# Patient Record
Sex: MALE | Race: WHITE | Employment: OTHER | URBAN - METROPOLITAN AREA
[De-identification: names, ages, dates, MRNs, and addresses within clinical notes are randomized per-mention and may not be internally consistent; named-entity substitution may affect disease eponyms.]

---

## 2021-03-31 ENCOUNTER — OFFICE VISIT (OUTPATIENT)
Dept: PRIMARY CARE CLINIC | Age: 61
End: 2021-03-31
Payer: COMMERCIAL

## 2021-03-31 VITALS
HEIGHT: 68 IN | WEIGHT: 130.6 LBS | BODY MASS INDEX: 19.79 KG/M2 | SYSTOLIC BLOOD PRESSURE: 122 MMHG | RESPIRATION RATE: 16 BRPM | TEMPERATURE: 96.9 F | DIASTOLIC BLOOD PRESSURE: 84 MMHG | OXYGEN SATURATION: 92 % | HEART RATE: 74 BPM

## 2021-03-31 DIAGNOSIS — Z53.20 COLONOSCOPY REFUSED: ICD-10-CM

## 2021-03-31 DIAGNOSIS — J45.909 ACUTE ASTHMATIC BRONCHITIS: Primary | ICD-10-CM

## 2021-03-31 DIAGNOSIS — F17.219 CIGARETTE NICOTINE DEPENDENCE WITH NICOTINE-INDUCED DISORDER: ICD-10-CM

## 2021-03-31 DIAGNOSIS — Z12.11 SCREENING FOR COLON CANCER: ICD-10-CM

## 2021-03-31 DIAGNOSIS — R06.02 SHORTNESS OF BREATH: ICD-10-CM

## 2021-03-31 DIAGNOSIS — R05.9 COUGH: ICD-10-CM

## 2021-03-31 DIAGNOSIS — R53.83 OTHER FATIGUE: ICD-10-CM

## 2021-03-31 PROCEDURE — 4004F PT TOBACCO SCREEN RCVD TLK: CPT | Performed by: INTERNAL MEDICINE

## 2021-03-31 PROCEDURE — 99204 OFFICE O/P NEW MOD 45 MIN: CPT | Performed by: INTERNAL MEDICINE

## 2021-03-31 PROCEDURE — G8427 DOCREV CUR MEDS BY ELIG CLIN: HCPCS | Performed by: INTERNAL MEDICINE

## 2021-03-31 PROCEDURE — 3017F COLORECTAL CA SCREEN DOC REV: CPT | Performed by: INTERNAL MEDICINE

## 2021-03-31 PROCEDURE — G8484 FLU IMMUNIZE NO ADMIN: HCPCS | Performed by: INTERNAL MEDICINE

## 2021-03-31 PROCEDURE — G8420 CALC BMI NORM PARAMETERS: HCPCS | Performed by: INTERNAL MEDICINE

## 2021-03-31 RX ORDER — AZITHROMYCIN 250 MG/1
250 TABLET, FILM COATED ORAL SEE ADMIN INSTRUCTIONS
Qty: 6 TABLET | Refills: 0 | Status: SHIPPED | OUTPATIENT
Start: 2021-03-31 | End: 2021-04-05

## 2021-03-31 RX ORDER — PREDNISONE 10 MG/1
TABLET ORAL
COMMUNITY
Start: 2021-03-29 | End: 2022-09-19

## 2021-03-31 RX ORDER — ALBUTEROL SULFATE 90 UG/1
AEROSOL, METERED RESPIRATORY (INHALATION)
COMMUNITY
Start: 2021-03-29

## 2021-03-31 SDOH — HEALTH STABILITY: MENTAL HEALTH: HOW OFTEN DO YOU HAVE A DRINK CONTAINING ALCOHOL?: NOT ASKED

## 2021-03-31 SDOH — ECONOMIC STABILITY: FOOD INSECURITY: WITHIN THE PAST 12 MONTHS, YOU WORRIED THAT YOUR FOOD WOULD RUN OUT BEFORE YOU GOT MONEY TO BUY MORE.: NOT ASKED

## 2021-03-31 SDOH — ECONOMIC STABILITY: TRANSPORTATION INSECURITY
IN THE PAST 12 MONTHS, HAS THE LACK OF TRANSPORTATION KEPT YOU FROM MEDICAL APPOINTMENTS OR FROM GETTING MEDICATIONS?: NOT ASKED

## 2021-03-31 SDOH — ECONOMIC STABILITY: FOOD INSECURITY: WITHIN THE PAST 12 MONTHS, THE FOOD YOU BOUGHT JUST DIDN'T LAST AND YOU DIDN'T HAVE MONEY TO GET MORE.: NOT ASKED

## 2021-03-31 ASSESSMENT — ENCOUNTER SYMPTOMS
RHINORRHEA: 0
WHEEZING: 1
SHORTNESS OF BREATH: 1
SINUS PRESSURE: 0
BLOOD IN STOOL: 0
TROUBLE SWALLOWING: 0
SORE THROAT: 1
SWOLLEN GLANDS: 0
EYE DISCHARGE: 0
SPUTUM PRODUCTION: 1
ABDOMINAL PAIN: 0
VOMITING: 0
NAUSEA: 0
CHEST TIGHTNESS: 0
EYE PAIN: 0
COUGH: 1
SINUS PAIN: 0

## 2021-03-31 ASSESSMENT — PATIENT HEALTH QUESTIONNAIRE - PHQ9
SUM OF ALL RESPONSES TO PHQ QUESTIONS 1-9: 0
1. LITTLE INTEREST OR PLEASURE IN DOING THINGS: 0
SUM OF ALL RESPONSES TO PHQ9 QUESTIONS 1 & 2: 0

## 2021-03-31 NOTE — PATIENT INSTRUCTIONS
COVID-19 test.    Blood work right now here and chest x-ray at [facility] [id number] [facility] x-ray department. COVID-19 precautions. Must quit smoking today or will need hospitalization. Explained -- chewable Vitamin C 500 mg 2 times a day and zinc 50 mg once a day and vitamin D3 2000 units every day --can help the body immune system to fight infection. Salt water gargles 3 times a day until throat symptoms completely gone and nothing to eat or drink for 1 hour after salt water gargles. Isolate yourself in the room with probably open window until all your symptoms are gone for 7 days. Ask any friends or family exposed to you get tested for COVID-19. Lot of fruits or vegetables or making a smoothie with yogurt. Can have ginger garlic turmeric etc. to boost their immune system. Ice packs for fever. Yogurt or probiotics. Avoid exertion. lot of fluids in daytime. Lean meats or eggs. Late night eating or drinking can cause cough. If coughing-lay on the belly rather than laying on the back. As laying on the belly puts you at higher risk of pneumonia. [place] or Robitussin DM or Mucinex DM for cough. For Allergies/sneezing- Loratadine 10 mg a day or fexofenadine 180 mg a day or  Cetirizine 10 mg a day can be sedating to some people    For Pain: Acetaminophen 500 mg- two tabs 3 x a day as needed. Avoid Motrin Advil Aleve or ibuprofen as in some patients it can affect the kidneys. Buy a pulse oximeter and check your pulse ox and make sure it is staying above 90%. If pulse oximeter shows pulse ox less than 90%-must go to ER. If short of breath or getting worse, then go to emergency room. Zithromax--yogurt a lot of fluids. Advair twice a day gargle and spit out with water after that. Strongly encouraged to quit using tobacco.   You can use off and on nicotine gum or lozenges to help quit smoking.     The [address] for Disease Control and Prevention states: Tobacco use harms every organ of the body which leads to disease and disability.  Tobacco use causes cancer, heart disease, stroke, and lung diseases (including emphysema, bronchitis, and chronic airway obstruction). We have many other ways to help you quit using tobacco.     We suggest this website:  www.smokefree. gov   You might also like this cell phone text message program.  Text message charges apply on your cell phone plan. Text the word QUIT to IQUIT to get started.  This program offers free telephone counseling. Dial 1-800-QUIT-Now    Discussed use, benefit, and side effects of prescribed medications. Barriers to compliance discussed. All patient questions answered. Pt voiced understanding. IF YOU NEED A PRESCRIPTION REFILL, THEN PLEASE GIVE US THREE WORKING DAYS TO REFILL A PRESCRIPTION. Office Hours to Answer Questions--Monday thru Thursday --9.00 AM to 4.00 PM    Please get all OVER DUE VACCINATIONS done at the pharmacy or health department as soon as possible.

## 2021-03-31 NOTE — PROGRESS NOTES
constantly. The problem has been gradually worsening. Associated symptoms include a sore throat, sputum production and wheezing. Pertinent negatives include no abdominal pain, chest pain, ear pain, fever, headaches, leg swelling, PND, rash, rhinorrhea, swollen glands or vomiting. The symptoms are aggravated by smoke. The patient has no known risk factors for DVT/PE. He has tried beta agonist inhalers for the symptoms. The treatment provided mild relief. Fatigue  This is a new problem. Episode onset: 4years. The problem occurs intermittently. The problem has been gradually worsening. Associated symptoms include coughing, fatigue and a sore throat. Pertinent negatives include no abdominal pain, chest pain, chills, congestion, fever, headaches, myalgias, nausea, numbness, rash, swollen glands, vomiting or weakness. Nothing aggravates the symptoms. He has tried nothing for the symptoms. The treatment provided no relief. URI   This is a new problem. The current episode started in the past 7 days (4days). The problem has been gradually improving. There has been no fever. Associated symptoms include coughing, a sore throat and wheezing. Pertinent negatives include no abdominal pain, chest pain, congestion, ear pain, headaches, nausea, rash, rhinorrhea, sinus pain, swollen glands or vomiting. He has tried inhaler use (Steroid tablets) for the symptoms. The treatment provided mild relief. Minimal by basilar atelectasis. Old granulomatous disease. No acute disease. 2018  Review of Systems   Constitutional: Positive for fatigue. Negative for appetite change, chills, fever and unexpected weight change. HENT: Positive for sore throat. Negative for congestion, ear discharge, ear pain, nosebleeds, rhinorrhea, sinus pressure, sinus pain and trouble swallowing. Eyes: Negative for pain and discharge. Respiratory: Positive for cough, sputum production, shortness of breath and wheezing. Negative for chest tightness. Cardiovascular: Negative for chest pain, palpitations, leg swelling and PND. Gastrointestinal: Negative for abdominal pain, blood in stool, nausea and vomiting. Endocrine: Negative for polydipsia and polyphagia. Genitourinary: Negative for difficulty urinating, enuresis, flank pain and hematuria. Musculoskeletal: Negative for myalgias. Skin: Negative for rash. Neurological: Negative for facial asymmetry, weakness, light-headedness, numbness and headaches. Psychiatric/Behavioral: Negative for confusion. Current Outpatient Medications on File Prior to Visit   Medication Sig Dispense Refill    albuterol sulfate  (90 Base) MCG/ACT inhaler       predniSONE (DELTASONE) 10 MG tablet        No current facility-administered medications on file prior to visit. Allergies   Allergen Reactions    Codeine Shortness Of Breath and Nausea And Vomiting     Past Medical History:   Diagnosis Date    Asthma      Past Surgical History:   Procedure Laterality Date    FINGER AMPUTATION  2016    right ring finger      Social History     Tobacco Use    Smoking status: Current Every Day Smoker     Years: 40.00     Types: Cigarettes    Smokeless tobacco: Never Used   Substance Use Topics    Alcohol use: Yes     Comment: 2-3 beer a day      Family History   Problem Relation Age of Onset    Esophageal Cancer Mother         Vitals:    03/31/21 1156   BP: 122/84   Site: Left Upper Arm   Position: Sitting   Cuff Size: Medium Adult   Pulse: 74   Resp: 16   Temp: 96.9 °F (36.1 °C)   SpO2: 92%   Weight: 130 lb 9.6 oz (59.2 kg)   Height: 5' 8\" (1.727 m)     Estimated body mass index is 19.86 kg/m² as calculated from the following:    Height as of this encounter: 5' 8\" (1.727 m). Weight as of this encounter: 130 lb 9.6 oz (59.2 kg). Physical Exam  Vitals signs and nursing note reviewed. Constitutional:       General: He is not in acute distress. HENT:      Head: Normocephalic and atraumatic. Right Ear: Tympanic membrane, ear canal and external ear normal.      Left Ear: Tympanic membrane, ear canal and external ear normal.   Eyes:      General: Lids are normal.      Conjunctiva/sclera: Conjunctivae normal.      Pupils: Pupils are equal, round, and reactive to light. Neck:      Musculoskeletal: Neck supple. Thyroid: No thyromegaly. Vascular: No JVD. Trachea: No tracheal deviation. Cardiovascular:      Rate and Rhythm: Normal rate and regular rhythm. Heart sounds: Normal heart sounds. No gallop. Pulmonary:      Effort: Pulmonary effort is normal. No respiratory distress. Breath sounds: Wheezing (  Bilateral tight wheezing) and rales present. Abdominal:      General: Bowel sounds are normal.      Palpations: Abdomen is soft. There is no mass. Tenderness: There is no abdominal tenderness. Musculoskeletal:         General: No tenderness. Comments: No leg edema or calf tenderness   Lymphadenopathy:      Cervical: No cervical adenopathy. Skin:     General: Skin is warm and dry. Findings: No rash. Neurological:      General: No focal deficit present. Mental Status: He is alert and oriented to person, place, and time. Cranial Nerves: No cranial nerve deficit. Sensory: No sensory deficit. Psychiatric:         Mood and Affect: Mood normal.         Behavior: Behavior normal.         Thought Content: Thought content normal.         Judgment: Judgment normal.         ASSESSMENT/PLAN:  1. Acute asthmatic bronchitis    - CBC Auto Differential; Future  - fluticasone-salmeterol (ADVAIR) 250-50 MCG/DOSE AEPB; Inhale 1 puff into the lungs every 12 hours  Dispense: 60 each; Refill: 0-gargle and spit out after that and eat food. - azithromycin (ZITHROMAX) 250 MG tablet; Take 1 tablet by mouth See Admin Instructions for 5 days 500mg on day 1 followed by 250mg on days 2 - 5  Dispense: 6 tablet; Refill: 0    2.  Shortness of breath    - Comprehensive Metabolic Panel; Future  - XR CHEST STANDARD (2 VW); Future  - Brain Natriuretic Peptide; Future  - COVID-19 Ambulatory; Future reviewed the information on COVID-19.    3. Other fatigue    - T4, Free; Future  - TSH with Reflex; Future    4. Cigarette nicotine dependence with nicotine-induced disorder  No cigarettes from today and till he gets better with the shortness of breath and maybe stay quit. 5. Cough    - CBC Auto Differential; Future  - Sedimentation Rate; Future  - COVID-19 Ambulatory; Future    6. Screening for colon cancer  Recommended to check for colon cancer risk. 7. Colonoscopy refused  Understanding the risk of cancer spread and death. Return in about 6 days (around 4/6/2021) for Labs and medication follow-up. Patient Instructions   COVID-19 test.    Blood work right now here and chest x-ray at [address], [facility] x-ray department. COVID-19 precautions. Must quit smoking today or will need hospitalization. Explained -- chewable Vitamin C 500 mg 2 times a day and zinc 50 mg once a day and vitamin D3 2000 units every day --can help the body immune system to fight infection. Salt water gargles 3 times a day until throat symptoms completely gone and nothing to eat or drink for 1 hour after salt water gargles. Isolate yourself in the room with probably open window until all your symptoms are gone for 7 days. Ask any friends or family exposed to you get tested for COVID-19. Lot of fruits or vegetables or making a smoothie with yogurt. Can have emmanuelle garlic turmeric etc. to boost their immune system. Ice packs for fever. Yogurt or probiotics. Avoid exertion. lot of fluids in daytime. Lean meats or eggs. Late night eating or drinking can cause cough. If coughing-lay on the belly rather than laying on the back. As laying on the belly puts you at higher risk of pneumonia. [place] or Robitussin DM or Mucinex DM for cough.     For Allergies/sneezing- Loratadine 10 mg a day or fexofenadine 180 mg a day or  Cetirizine 10 mg a day can be sedating to some people    For Pain: Acetaminophen 500 mg- two tabs 3 x a day as needed. Avoid Motrin Advil Aleve or ibuprofen as in some patients it can affect the kidneys. Buy a pulse oximeter and check your pulse ox and make sure it is staying above 90%. If pulse oximeter shows pulse ox less than 90%-must go to ER. If short of breath or getting worse, then go to emergency room. Zithromax--yogurt a lot of fluids. Advair twice a day gargle and spit out with water after that. Strongly encouraged to quit using tobacco.   You can use off and on nicotine gum or lozenges to help quit smoking. The [address] for Disease Control and Prevention states:    Tobacco use harms every organ of the body which leads to disease and disability.  Tobacco use causes cancer, heart disease, stroke, and lung diseases (including emphysema, bronchitis, and chronic airway obstruction). We have many other ways to help you quit using tobacco.     We suggest this website:  www.smokefree. gov   You might also like this cell phone text message program.  Text message charges apply on your cell phone plan. Text the word QUIT to PHILL to get started.  This program offers free telephone counseling. Dial 1800-QUIT-Now    Discussed use, benefit, and side effects of prescribed medications. Barriers to compliance discussed. All patient questions answered. Pt voiced understanding. IF YOU NEED A PRESCRIPTION REFILL, THEN PLEASE GIVE US THREE WORKING DAYS TO REFILL A PRESCRIPTION. Office Hours to Answer Questions--Monday thru Thursday --9.00 AM to 4.00 PM    Please get all OVER DUE VACCINATIONS done at the pharmacy or health department as soon as possible. Electronically signed by Joann Harrington MD on 3/31/2021 at 12:58 PM     This dictation was generated by voice recognition computer software.  Although all attempts are made to edit the dictation for accuracy, there may be errors in the transcription that are not intended.

## 2021-04-01 ENCOUNTER — TELEPHONE (OUTPATIENT)
Dept: PRIMARY CARE CLINIC | Age: 61
End: 2021-04-01

## 2021-04-01 NOTE — TELEPHONE ENCOUNTER
I tried to call the patient to set up a covid test but his VM has not been set up. I was unable to LVM.

## 2021-04-01 NOTE — TELEPHONE ENCOUNTER
I spoke with pt and asked if or when he was going to have his labs and xray done he stated he thought he would go next week before his appt. Advised pt to try and go today (perferably) or in the morning. Pt asked lab hrs for today and tomorrow wich I gave 7am-330pm. And advised to get xray at [facility].

## 2021-04-01 NOTE — TELEPHONE ENCOUNTER
----- Message from Denisse Devlin MD sent at 4/1/2021  8:36 AM EDT -----  Regarding: labs xray  Why did not he do blood work here? Why did not he go for the chest x-ray yesterday?   If his shortness of breath gets worse as he has not done the test he may have to go to emergency room get admitted to the hospital.

## 2021-04-30 PROBLEM — Z12.11 SCREENING FOR COLON CANCER: Status: RESOLVED | Noted: 2021-03-31 | Resolved: 2021-04-30

## 2021-04-30 PROBLEM — R05.9 COUGH: Status: RESOLVED | Noted: 2021-03-31 | Resolved: 2021-04-30

## 2022-08-29 ENCOUNTER — OFFICE VISIT (OUTPATIENT)
Dept: ENT CLINIC | Age: 62
End: 2022-08-29
Payer: COMMERCIAL

## 2022-08-29 VITALS — TEMPERATURE: 98 F | HEART RATE: 74 BPM | DIASTOLIC BLOOD PRESSURE: 90 MMHG | SYSTOLIC BLOOD PRESSURE: 128 MMHG

## 2022-08-29 DIAGNOSIS — J38.3 LESION OF TRUE VOCAL CORD: ICD-10-CM

## 2022-08-29 DIAGNOSIS — R49.0 DYSPHONIA: ICD-10-CM

## 2022-08-29 DIAGNOSIS — J31.0 CHRONIC RHINITIS: ICD-10-CM

## 2022-08-29 DIAGNOSIS — J38.3 VOCAL CORD MASS: Primary | ICD-10-CM

## 2022-08-29 DIAGNOSIS — J34.2 DEVIATED NASAL SEPTUM: ICD-10-CM

## 2022-08-29 PROCEDURE — G8421 BMI NOT CALCULATED: HCPCS | Performed by: STUDENT IN AN ORGANIZED HEALTH CARE EDUCATION/TRAINING PROGRAM

## 2022-08-29 PROCEDURE — 31231 NASAL ENDOSCOPY DX: CPT | Performed by: STUDENT IN AN ORGANIZED HEALTH CARE EDUCATION/TRAINING PROGRAM

## 2022-08-29 PROCEDURE — 3017F COLORECTAL CA SCREEN DOC REV: CPT | Performed by: STUDENT IN AN ORGANIZED HEALTH CARE EDUCATION/TRAINING PROGRAM

## 2022-08-29 PROCEDURE — 99204 OFFICE O/P NEW MOD 45 MIN: CPT | Performed by: STUDENT IN AN ORGANIZED HEALTH CARE EDUCATION/TRAINING PROGRAM

## 2022-08-29 PROCEDURE — 4004F PT TOBACCO SCREEN RCVD TLK: CPT | Performed by: STUDENT IN AN ORGANIZED HEALTH CARE EDUCATION/TRAINING PROGRAM

## 2022-08-29 PROCEDURE — G8427 DOCREV CUR MEDS BY ELIG CLIN: HCPCS | Performed by: STUDENT IN AN ORGANIZED HEALTH CARE EDUCATION/TRAINING PROGRAM

## 2022-08-29 RX ORDER — BUDESONIDE AND FORMOTEROL FUMARATE DIHYDRATE 160; 4.5 UG/1; UG/1
2 AEROSOL RESPIRATORY (INHALATION) 2 TIMES DAILY
COMMUNITY
Start: 2022-07-12

## 2022-08-29 NOTE — PROGRESS NOTES
Sarah [facility] ESTHER Levin (:  1960) is a 57 y.o. male, here for evaluation of the following chief complaint(s): Other (Throat issue for a few months hard to speak)      ASSESSMENT/PLAN:  1. Vocal cord mass  -     CT SOFT TISSUE NECK W CONTRAST; Future  -     CT CHEST W CONTRAST; Future  2. Dysphonia  3. Lesion of true vocal cord  4. Deviated nasal septum  5. Chronic rhinitis      This is a very pleasant 57 y.o. male here today for evaluation of the the above-noted complaints. 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 There is also some whitish changes to the right true vocal cord. I discussed with the patient that the neck step will be to get a videostrobe with her speech and language pathologist.  I am ordering a CT neck and chest to evaluate for underlying extension or spread of the lesion. We will see him back to go over his CT scans and discuss neck steps. We discussed that we will need to at least perform a biopsy to determine next steps. We will plan to observe his sinonasal symptoms/findings for now while we work-up his vocal cord lesion. I have asked patient to try to work on smoking cessation. Medical Decision Making: The following items were considered in medical decision making:  Independent review of images  Review / order clinical lab tests  Review / order radiology tests  Decision to obtain old records  Review and summation of [facility]    SUBJECTIVE/OBJECTIVE:  JOSH Cohn is here today for evaluation of voice and nasal issues. The patient states that he has had at least 4 months of hoarseness. It is gradually getting worse. Patient gets occasional loss of voice. He denies pain with speaking.   He denies dysphagia. Patient gets some nasal obstruction and clear nasal drainage. He gets crusting from both nostrils. He denies fevers chills or night sweats. He denies unintentional weight loss. He denies neck masses. He had a CT scan done in January 2022 which showed a lesion of the vocal cord at that time. The patient, he was not informed that his CT scan showed anything on his vocal cord. The patient smokes about a half a pack per day. He has been smoking for over 40 years. He does not drink. REVIEW OF SYSTEMS  The following systems were reviewed and revealed the following in addition to any already discussed in the HPI:    PHYSICAL EXAM    GENERAL: No acute distress, alert and oriented, hoarse and rough voice  EYES: EOMI, Anti-icteric  HENT:   Head: Normocephalic and atraumatic. Face:  Symmetric, facial nerve intact  Right Ear: Normal external ear, normal external auditory canal, intact tympanic membrane with normal mobility and aerated middle ear  Left Ear: Normal external ear, normal external auditory canal, intact tympanic membrane with normal mobility and aerated middle ear  Mouth/Oral Cavity:  normal lips, Uvula is midline, no mucosal lesions, no trismus, very poor dentition, normal salivary quality/flow  Oropharynx/Larynx:  normal oropharynx,    Nose:Normal external nasal appearance. Anterior rhinoscopy shows  a deviated septum preventing view posteriorly. Normal appearing turbinates.   Normal mucosa   NECK: Normal range of motion, no thyromegaly, trachea is midline, no lymphadenopathy, no neck masses, no crepitus  CHEST: Normal respiratory effort, no retractions, breathing comfortably  SKIN: No rashes, normal appearing skin, no evidence of skin lesions/tumors  Neuro:  cranial nerve II-XII intact; normal gait  Cardio:  no edema        PROCEDURE  Nasal Endoscopy (CPT code 70443)       Due to the patients chronic sinus disease and/or history of sinonasal neoplasm for surveillance a nasal endoscopy with or without debridement will be performed to complete a significant physical examination of the patient which cannot be performed by anterior rhinoscopy alone (failure of complete examination of the paranasal sinuses). Failure to provide this procedure may lead to late detection of significant chronic benign disease, acute exacerbation, resolution or failure of early diagnosis of recurrent cancer. The procedure report is present in the body of the chart. Verbal consent was received. After topical anesthesia and decongestion had been obtained using aerosolized 1% lidocaine and oxymetazoline, a 45 degree rigid endoscope was placed into both nares with the patient in a sitting position. The following was observed:    Septum: intact and deviated   Other:   -The inferior and middle turbinates were examined. The middle meatus, and sphenoethmoid recess was examined bilaterally.    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 The nasopharynx, oropharynx, hypopharynx, supraglottis and glottis were visualized. -Evidence of an exophytic lesion involving the entire left true cord. There was some whitish changes to the right true cord. True vocal cord motion was diminished on the left. -There were no complications. Tolerated well without complication. I attest that I was present for and did the entire procedure myself. This note was generated completely or in part utilizing Dragon dictation speech recognition software. Occasionally, words are mistranscribed and despite editing, the text may contain inaccuracies due to incorrect word recognition. If further clarification is needed please contact the office at [phone]. An electronic signature was used to authenticate this note.     --Leticia Taylor MD

## 2022-09-01 ENCOUNTER — PROCEDURE VISIT (OUTPATIENT)
Dept: SPEECH THERAPY | Age: 62
End: 2022-09-01
Payer: COMMERCIAL

## 2022-09-01 DIAGNOSIS — R49.0 DYSPHONIA: Primary | ICD-10-CM

## 2022-09-01 DIAGNOSIS — J38.3 LESION OF TRUE VOCAL CORD: ICD-10-CM

## 2022-09-01 PROCEDURE — 92507 TX SP LANG VOICE COMM INDIV: CPT | Performed by: SPEECH-LANGUAGE PATHOLOGIST

## 2022-09-01 PROCEDURE — 92520 LARYNGEAL FUNCTION STUDIES: CPT | Performed by: SPEECH-LANGUAGE PATHOLOGIST

## 2022-09-01 PROCEDURE — 92524 BEHAVRAL QUALIT ANALYS VOICE: CPT | Performed by: SPEECH-LANGUAGE PATHOLOGIST

## 2022-09-01 PROCEDURE — 31579 LARYNGOSCOPY TELESCOPIC: CPT | Performed by: SPEECH-LANGUAGE PATHOLOGIST

## 2022-09-01 NOTE — Clinical Note
May benefit from reflux meds to reduce irritation. Also interested in prescription for nebulizer given breathing complaints. Thank you!

## 2022-09-01 NOTE — PROGRESS NOTES
[facility]) ENT  Videostroboscopic Examination of the Larynx    BACKGROUND HISTORY:  Pt w/ hx of hoarseness that onset in Jan '22; completed CT per pulmonologist revealing suspected nodule on LTVF and referred to ENT however, pt did not follow-through w/ appt until this month. Reported dysphonia characterized by roughness and reduced volume; denied pain/discomfort w/ use. Dyspnea characterized by sensation of restricted breathing; hx of COPD and asthma. Would like to obtain nebulizer for home use. Denied dysphagia. Denied hx of acid reflux; not currently on any medications. Seen by ENT and referred for further documentation of concerning LTVF lesion. Surgical/Medical History: See the above. Hydration: <64 oz daily  Smoking History: 1 ppd for 40 years  Caffeine Intake: Coffee/pop    PER ENT NOTE, Dr. Nabila Monreal, 8/29/22:  I discussed with the patient that the neck step will be to get a videostrobe with her speech and language pathologist.  I am ordering a CT neck and chest to evaluate for underlying extension or spread of the lesion. Perceptual Quality: Pt presented with severe dysphonia characterized by reduced conversational volume, roughness, breathiness, and pressed phonation. Acoustic Analysis:  Maximum Sustained Phonation- 6 seconds  Avg Hz- 168  Max Hz- 216  Min Hz- 97    Flexible Stroboscopy Laryngoscopy  Procedure : Flexible Stroboscopy Laryngoscopy  Performed by: Pio Bashir, SLP  Anesthesia: NA (found anesthesia bothersome after initial scope)  Description:  The scope was passed along the floor of the left naris to the level of the larynx. There was no evidence of concerning masses or lesions of the base of tongue, vallecula, epiglottis, aryepiglottic folds, arytenoids, false vocal folds, true vocal folds, or pyriform sinuses. The scope was removed. The patient tolerated the procedure without difficulty. There were no complications.   Pertinent findings: See Assessment and Plan of Care ASSESSMENT AND PLAN OF CARE:   57 y.o. male w/ hx of dysphonia; onset Jan '22 w/ CT concerning for LTVF nodule. VLS revealed generalized irritation of pharynx/larynx, including erythema of nasopharynx and thick pharyngeal sputum w/ pooling of frothy secretions. LTVF w/ white, exophytic changes extending anterior/posterior and increased irregular changes on medial-posterior extending inferior. Unable to fully assess RTVF d/t obstruction of LTVF but posterior 1/3rd appears edematous w/o irregular tissue changes. Glottic closure irregular w/ absent mucosal wave of LTVF; unable to visualize RTVF during phonation. Supraglottic compression (LM>AP). Mild-moderate interarytenoid pachydermia and posterior edema, erythema and thick mucus were observed, indicative of laryngopharyngeal reflux. No paresis or paralysis was noted. EDUCATION AND THERAPY:  Reviewed VLS w/ pt and girlfriend Will Nicki); educated to presence of LTVF changes and direct impact on both dysphonia/dyspnea. Discussed restricted patency of glottis resulting in increased work of breathing; pt expressed understanding. Majority of session spent educating on POC, including ENT recommendation for biopsy to determine treatment. Pt expressed understanding. At this time, pt to f/u w/ ENT and will RTC for therapeutic intervention as warranted. PATIENT GOALS:  TBD    SLP recommended: Yes  Barriers to treatment: Unknown etiology    RECOMMENDATIONS:   Dr. Luciana Aquino was present and reviewed recorded evaluation to assist in diagnosis and provide assessment and plan for treatment. Follow good vocal hygiene behaviors and precautions, including increasing oral hydration. Follow dietary precautions and behavioral lifestyle changes regarding laryngopharyngeal reflux, including taking PPI as prescribed by physician. Pt is a good candidate for further medical evaluation/intervention at the discretion of the treating physician.    Pt to follow up with ENT/ [place].      CPT Code Units Billed Time Billed Today Date of POC Start Re-Certification Date Referring Provider   25064, 92135, 27994, 44277 4 Unit Time in: 3059  Time out: 1005  Total time: 35 min 9/1/2022 60 days Dr. Amanda Penny       Thank you,    Veverephraim Bey) [place], [place], Kenneth Ball; [id number]  Voice Specialized Speech-Language Pathologist

## 2022-09-06 ENCOUNTER — TELEPHONE (OUTPATIENT)
Dept: ENT CLINIC | Age: 62
End: 2022-09-06

## 2022-09-06 NOTE — TELEPHONE ENCOUNTER
[phone] ([place])  called patient voicemail box is not set up yet , wanted to get him rescheduled with  Monday 12th at 10am or 11am .

## 2022-09-12 ENCOUNTER — HOSPITAL ENCOUNTER (OUTPATIENT)
Dept: CT IMAGING | Age: 62
Discharge: HOME | End: 2022-09-12
Payer: COMMERCIAL

## 2022-09-12 DIAGNOSIS — J38.3 VOCAL CORD MASS: ICD-10-CM

## 2022-09-12 LAB
CREAT SERPL-MCNC: 0.6 MG/DL (ref 0.8–1.3)
GFR AFRICAN AMERICAN: >60
GFR NON-AFRICAN AMERICAN: >60

## 2022-09-12 PROCEDURE — 71260 CT THORAX DX C+: CPT

## 2022-09-12 PROCEDURE — 70491 CT SOFT TISSUE NECK W/DYE: CPT

## 2022-09-12 PROCEDURE — 6360000004 HC RX CONTRAST MEDICATION: Performed by: STUDENT IN AN ORGANIZED HEALTH CARE EDUCATION/TRAINING PROGRAM

## 2022-09-12 PROCEDURE — 36415 COLL VENOUS BLD VENIPUNCTURE: CPT

## 2022-09-12 PROCEDURE — 82565 ASSAY OF CREATININE: CPT

## 2022-09-12 RX ADMIN — IOPAMIDOL 75 ML: 755 INJECTION, SOLUTION INTRAVENOUS at 16:56

## 2022-09-19 ENCOUNTER — OFFICE VISIT (OUTPATIENT)
Dept: ENT CLINIC | Age: 62
End: 2022-09-19
Payer: COMMERCIAL

## 2022-09-19 VITALS — DIASTOLIC BLOOD PRESSURE: 91 MMHG | SYSTOLIC BLOOD PRESSURE: 138 MMHG | HEART RATE: 68 BPM | TEMPERATURE: 97.7 F

## 2022-09-19 DIAGNOSIS — J38.3 LESION OF TRUE VOCAL CORD: ICD-10-CM

## 2022-09-19 DIAGNOSIS — J38.3 VOCAL CORD MASS: Primary | ICD-10-CM

## 2022-09-19 DIAGNOSIS — R49.0 DYSPHONIA: ICD-10-CM

## 2022-09-19 PROCEDURE — 4004F PT TOBACCO SCREEN RCVD TLK: CPT | Performed by: STUDENT IN AN ORGANIZED HEALTH CARE EDUCATION/TRAINING PROGRAM

## 2022-09-19 PROCEDURE — G8427 DOCREV CUR MEDS BY ELIG CLIN: HCPCS | Performed by: STUDENT IN AN ORGANIZED HEALTH CARE EDUCATION/TRAINING PROGRAM

## 2022-09-19 PROCEDURE — G8421 BMI NOT CALCULATED: HCPCS | Performed by: STUDENT IN AN ORGANIZED HEALTH CARE EDUCATION/TRAINING PROGRAM

## 2022-09-19 PROCEDURE — 3017F COLORECTAL CA SCREEN DOC REV: CPT | Performed by: STUDENT IN AN ORGANIZED HEALTH CARE EDUCATION/TRAINING PROGRAM

## 2022-09-19 PROCEDURE — 99214 OFFICE O/P EST MOD 30 MIN: CPT | Performed by: STUDENT IN AN ORGANIZED HEALTH CARE EDUCATION/TRAINING PROGRAM

## 2022-09-19 NOTE — PROGRESS NOTES
[address] [facility] (:  1960) is a 57 y.o. male, here for evaluation of the following chief complaint(s):  Follow-up (From ct scan and videostrobe , no new concerns)      ASSESSMENT/PLAN:  1. Vocal cord mass  2. Lesion of true vocal cord  3. Dysphonia      This is a very pleasant 57 y.o. male here today for evaluation of the the above-noted complaints. 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 There is also some whitish changes to the right true vocal cord. Video stroboscopy revealed large vocal cord mass and with preserved VC motion. CT chest and neck reviewed. There is evidence of 1/2 cm mass without obvious invasion of the paraglottic space or erosion of the thyroid cartilage. There is a small subcentimeter nodule and extensive COPD changes to the lungs. Discussed with the patient that the neck steps will be direct laryngoscopy with biopsy, flexible esophagoscopy and bronchoscopy due to his history of smoking, regular alcohol use and suspected head and neck cancer. Discussed the risks benefits and alternatives including the risk of bleeding, infection, injury to the teeth lips or gums, injury to the trachea or esophagus resulting in viscus perforation, changes to voice, airway obstruction, anesthesia complications or even death. We discussed possible need for an emergent tracheostomy if there is an airway obstruction. Medical Decision Making:   The following items were considered in medical decision making:  Independent review of images  Review / order clinical lab tests  Review / order radiology tests  Decision to obtain old records  Review and summation of old records as accessed through [facility] if applicable    SUBJECTIVE/OBJECTIVE:  JOSH Ha Suki is here today for evaluation of voice and nasal issues. The patient states that he has had at least 4 months of hoarseness. It is gradually getting worse. Patient gets occasional loss of voice. He denies pain with speaking. He denies dysphagia. Patient gets some nasal obstruction and clear nasal drainage. He gets crusting from both nostrils. He denies fevers chills or night sweats. He denies unintentional weight loss. He denies neck masses. He had a CT scan done in January 2022 which showed a lesion of the vocal cord at that time. The patient, he was not informed that his CT scan showed anything on his vocal cord. The patient smokes about a half a pack per day. He has been smoking for over 40 years. He does not drink. Update 9/19/2022:    Still having difficulties with dysphonia. Still smoking and drinking. REVIEW OF SYSTEMS  The following systems were reviewed and revealed the following in addition to any already discussed in the HPI:    PHYSICAL EXAM    GENERAL: No acute distress, alert and oriented, hoarse and rough voice  EYES: EOMI, Anti-icteric  HENT:   Head: Normocephalic and atraumatic. Face:  Symmetric, facial nerve intact  Right Ear: Normal external ear, normal external auditory canal, intact tympanic membrane with normal mobility and aerated middle ear  Left Ear: Normal external ear, normal external auditory canal, intact tympanic membrane with normal mobility and aerated middle ear  Mouth/Oral Cavity:  normal lips, Uvula is midline, no mucosal lesions, no trismus, very poor dentition, normal salivary quality/flow  Oropharynx/Larynx:  normal oropharynx,    Nose:Normal external nasal appearance. Anterior rhinoscopy shows  a deviated septum preventing view posteriorly. Normal appearing turbinates.   Normal mucosa   NECK: Normal range of motion, no thyromegaly, trachea is midline, no lymphadenopathy, no neck masses, no crepitus  CHEST: Normal respiratory effort, no retractions, breathing comfortably  SKIN: No rashes, normal appearing skin, no evidence of skin lesions/tumors  Neuro:  cranial nerve II-XII intact; normal gait  Cardio:  no edema        PROCEDURE  Nasal Endoscopy (CPT code 89147) from prior visit       Due to the patients chronic sinus disease and/or history of sinonasal neoplasm for surveillance a nasal endoscopy with or without debridement will be performed to complete a significant physical examination of the patient which cannot be performed by anterior rhinoscopy alone (failure of complete examination of the paranasal sinuses). Failure to provide this procedure may lead to late detection of significant chronic benign disease, acute exacerbation, resolution or failure of early diagnosis of recurrent cancer. The procedure report is present in the body of the chart. Verbal consent was received. After topical anesthesia and decongestion had been obtained using aerosolized 1% lidocaine and oxymetazoline, a 45 degree rigid endoscope was placed into both nares with the patient in a sitting position. The following was observed:    Septum: intact and deviated   Other:   -The inferior and middle turbinates were examined. The middle meatus, and sphenoethmoid recess was examined bilaterally.    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 The nasopharynx, oropharynx, hypopharynx, supraglottis and glottis were visualized. -Evidence of an exophytic lesion involving the entire left true cord. There was some whitish changes to the right true cord. True vocal cord motion was diminished on the left. -There were no complications. Tolerated well without complication. I attest that I was present for and did the entire procedure myself.       This note was generated completely or in part utilizing Dragon dictation speech recognition software. Occasionally, words are mistranscribed and despite editing, the text may contain inaccuracies due to incorrect word recognition. If further clarification is needed please contact the office at [phone]. An electronic signature was used to authenticate this note.     --Pako Ro MD

## 2022-09-19 NOTE — PROGRESS NOTES
[facility] time____1125________        Surgery time___1325_________    Take the following medications with a sip of water: Follow your MD/Surgeons pre-procedure instructions regarding your medications     Do not eat or drink anything after 12:00 midnight prior to your surgery. This includes water chewing gum, mints and ice chips. You may brush your teeth and gargle the morning of your surgery, but do not swallow the water     Please see your family doctor/pediatrician for a history and physical and/or concerning medications. Bring any test results/reports from your physicians office. If you are under the care of a heart doctor or specialist doctor, please be aware that you may be asked to them for clearance    You may be asked to stop blood thinners such as Coumadin, Plavix, Fragmin, Lovenox, etc., or any anti-inflammatories such as:  Aspirin, Ibuprofen, Advil, Naproxen prior to your surgery. We also ask that you stop any OTC medications such as fish oil, vitamin E, glucosamine, garlic, Multivitamins, COQ 10, etc.    We ask that you do not smoke 24 hours prior to surgery  We ask that you do not  drink any alcoholic beverages 24 hours prior to surgery     You must make arrangements for a responsible adult to take you home after your surgery. For your safety you will not be allowed to leave alone or drive yourself home. Your surgery will be cancelled if you do not have a ride home. Also for your safety, it is strongly suggested that someone stay with you the first 24 hours after your surgery. A parent or legal guardian must accompany a child scheduled for surgery and plan to stay at the hospital until the child is discharged. Please do not bring other children with you. For your comfort, please wear simple loose fitting clothing to the hospital.  Please do not bring valuables.     Do not wear any make-up or nail polish on your fingers or toes      For your safety, please do not wear any jewelry or body piercing's on the day of surgery. All jewelry must be removed. If you have dentures, they will be removed before going to operating room. For your convenience, we will provide you with a container. If you wear contact lenses or glasses, they will be removed, please bring a case for them. If you have a living will and a durable power of  for healthcare, please bring in a copy. As part of our patient safety program to minimize surgical site infections, we ask you to do the following:    Please notify your surgeon if you develop any illness between         now and the  day of your surgery. This includes a cough, cold, fever, sore throat, nausea,         or vomiting, and diarrhea, etc.   Please notify your surgeon if you experience dizziness, shortness         of breath or blurred vision between now and the time of your surgery. Do not shave your operative site 96 hours prior to surgery. For face and neck surgery, men may use an electric razor 48 hours   prior to surgery. You may shower the night before surgery or the morning of   your surgery with an antibacterial soap. You will need to bring a photo ID and insurance card    [facility] has an onsite pharmacy, would you like to utilize our pharmacy     If you will be staying overnight and use a C-pap machine, please bring   your C-pap to hospital     Our goal is to provide you with excellent care, therefore, visitors will be limited to two(2) in the room at a time so that we may focus on providing this care for you. Please contact pre-admission testing if you have any further questions. [facility] phone number:  [phone] PAT fax number:  [phone]  Please note these are generalized instructions for all surgical cases, you may be provided with more specific instructions according to your surgery.     C-Difficile admission screening and protocol:       * Admitted with diarrhea? [] YES    [x]  NO     *Prior history of C-Diff. In last 3 months? [] YES    [x]  NO     *Antibiotic use in the past 6-8 weeks? [x]  NO    []  YES                 If yes, which ANTIBIOTIC AND REASON______     *Prior hospitalization or nursing home in the last month? []  YES    [x]  NO        SAFETY FIRST. .call before you fall

## 2022-09-20 ENCOUNTER — TELEPHONE (OUTPATIENT)
Dept: ENT CLINIC | Age: 62
End: 2022-09-20

## 2022-09-21 ENCOUNTER — ANESTHESIA EVENT (OUTPATIENT)
Dept: OPERATING ROOM | Age: 62
End: 2022-09-21
Payer: COMMERCIAL

## 2022-09-22 ENCOUNTER — ANESTHESIA (OUTPATIENT)
Dept: OPERATING ROOM | Age: 62
End: 2022-09-22
Payer: COMMERCIAL

## 2022-09-22 ENCOUNTER — HOSPITAL ENCOUNTER (OUTPATIENT)
Age: 62
Setting detail: OUTPATIENT SURGERY
Discharge: HOME | End: 2022-09-22
Attending: STUDENT IN AN ORGANIZED HEALTH CARE EDUCATION/TRAINING PROGRAM | Admitting: STUDENT IN AN ORGANIZED HEALTH CARE EDUCATION/TRAINING PROGRAM
Payer: COMMERCIAL

## 2022-09-22 VITALS
BODY MASS INDEX: 20.31 KG/M2 | RESPIRATION RATE: 23 BRPM | HEIGHT: 68 IN | TEMPERATURE: 99.4 F | SYSTOLIC BLOOD PRESSURE: 140 MMHG | HEART RATE: 94 BPM | DIASTOLIC BLOOD PRESSURE: 85 MMHG | OXYGEN SATURATION: 93 % | WEIGHT: 134 LBS

## 2022-09-22 DIAGNOSIS — G89.18 POST-OP PAIN: ICD-10-CM

## 2022-09-22 DIAGNOSIS — J38.3 LESION OF VOCAL CORD: ICD-10-CM

## 2022-09-22 DIAGNOSIS — R49.0 DYSPHONIA: ICD-10-CM

## 2022-09-22 DIAGNOSIS — Z72.0 TOBACCO ABUSE: ICD-10-CM

## 2022-09-22 DIAGNOSIS — C32.0 VOCAL CORD CANCER (HCC): Primary | ICD-10-CM

## 2022-09-22 DIAGNOSIS — J38.3 VOCAL CORD MASS: ICD-10-CM

## 2022-09-22 PROCEDURE — 43200 ESOPHAGOSCOPY FLEXIBLE BRUSH: CPT | Performed by: STUDENT IN AN ORGANIZED HEALTH CARE EDUCATION/TRAINING PROGRAM

## 2022-09-22 PROCEDURE — 2500000003 HC RX 250 WO HCPCS: Performed by: NURSE ANESTHETIST, CERTIFIED REGISTERED

## 2022-09-22 PROCEDURE — 2580000003 HC RX 258: Performed by: ANESTHESIOLOGY

## 2022-09-22 PROCEDURE — 7100000001 HC PACU RECOVERY - ADDTL 15 MIN: Performed by: STUDENT IN AN ORGANIZED HEALTH CARE EDUCATION/TRAINING PROGRAM

## 2022-09-22 PROCEDURE — 31622 DX BRONCHOSCOPE/WASH: CPT | Performed by: STUDENT IN AN ORGANIZED HEALTH CARE EDUCATION/TRAINING PROGRAM

## 2022-09-22 PROCEDURE — 7100000000 HC PACU RECOVERY - FIRST 15 MIN: Performed by: STUDENT IN AN ORGANIZED HEALTH CARE EDUCATION/TRAINING PROGRAM

## 2022-09-22 PROCEDURE — 6370000000 HC RX 637 (ALT 250 FOR IP): Performed by: STUDENT IN AN ORGANIZED HEALTH CARE EDUCATION/TRAINING PROGRAM

## 2022-09-22 PROCEDURE — 6370000000 HC RX 637 (ALT 250 FOR IP): Performed by: ANESTHESIOLOGY

## 2022-09-22 PROCEDURE — 3700000001 HC ADD 15 MINUTES (ANESTHESIA): Performed by: STUDENT IN AN ORGANIZED HEALTH CARE EDUCATION/TRAINING PROGRAM

## 2022-09-22 PROCEDURE — 94640 AIRWAY INHALATION TREATMENT: CPT

## 2022-09-22 PROCEDURE — 3700000000 HC ANESTHESIA ATTENDED CARE: Performed by: STUDENT IN AN ORGANIZED HEALTH CARE EDUCATION/TRAINING PROGRAM

## 2022-09-22 PROCEDURE — 2700000000 HC OXYGEN THERAPY PER DAY

## 2022-09-22 PROCEDURE — 6360000002 HC RX W HCPCS: Performed by: ANESTHESIOLOGY

## 2022-09-22 PROCEDURE — 6360000002 HC RX W HCPCS: Performed by: STUDENT IN AN ORGANIZED HEALTH CARE EDUCATION/TRAINING PROGRAM

## 2022-09-22 PROCEDURE — 94761 N-INVAS EAR/PLS OXIMETRY MLT: CPT

## 2022-09-22 PROCEDURE — 2709999900 HC NON-CHARGEABLE SUPPLY: Performed by: STUDENT IN AN ORGANIZED HEALTH CARE EDUCATION/TRAINING PROGRAM

## 2022-09-22 PROCEDURE — 31541 LARYNSCOP W/TUMR EXC + SCOPE: CPT | Performed by: STUDENT IN AN ORGANIZED HEALTH CARE EDUCATION/TRAINING PROGRAM

## 2022-09-22 PROCEDURE — 6360000002 HC RX W HCPCS: Performed by: NURSE ANESTHETIST, CERTIFIED REGISTERED

## 2022-09-22 PROCEDURE — 88305 TISSUE EXAM BY PATHOLOGIST: CPT

## 2022-09-22 PROCEDURE — 3600000003 HC SURGERY LEVEL 3 BASE: Performed by: STUDENT IN AN ORGANIZED HEALTH CARE EDUCATION/TRAINING PROGRAM

## 2022-09-22 PROCEDURE — 2580000003 HC RX 258: Performed by: STUDENT IN AN ORGANIZED HEALTH CARE EDUCATION/TRAINING PROGRAM

## 2022-09-22 PROCEDURE — A4217 STERILE WATER/SALINE, 500 ML: HCPCS | Performed by: STUDENT IN AN ORGANIZED HEALTH CARE EDUCATION/TRAINING PROGRAM

## 2022-09-22 PROCEDURE — 3600000013 HC SURGERY LEVEL 3 ADDTL 15MIN: Performed by: STUDENT IN AN ORGANIZED HEALTH CARE EDUCATION/TRAINING PROGRAM

## 2022-09-22 PROCEDURE — 88331 PATH CONSLTJ SURG 1 BLK 1SPC: CPT

## 2022-09-22 RX ORDER — FENTANYL CITRATE 50 UG/ML
50 INJECTION, SOLUTION INTRAMUSCULAR; INTRAVENOUS EVERY 5 MIN PRN
Status: DISCONTINUED | OUTPATIENT
Start: 2022-09-22 | End: 2022-09-22 | Stop reason: HOSPADM

## 2022-09-22 RX ORDER — SODIUM CHLORIDE 9 MG/ML
INJECTION, SOLUTION INTRAVENOUS PRN
Status: DISCONTINUED | OUTPATIENT
Start: 2022-09-22 | End: 2022-09-22 | Stop reason: HOSPADM

## 2022-09-22 RX ORDER — TRAMADOL HYDROCHLORIDE 50 MG/1
50 TABLET ORAL EVERY 6 HOURS PRN
Qty: 10 TABLET | Refills: 0 | Status: SHIPPED | OUTPATIENT
Start: 2022-09-22 | End: 2022-09-25

## 2022-09-22 RX ORDER — SUCCINYLCHOLINE/SOD CL,ISO/PF 200MG/10ML
SYRINGE (ML) INTRAVENOUS PRN
Status: DISCONTINUED | OUTPATIENT
Start: 2022-09-22 | End: 2022-09-22 | Stop reason: SDUPTHER

## 2022-09-22 RX ORDER — ONDANSETRON 2 MG/ML
INJECTION INTRAMUSCULAR; INTRAVENOUS PRN
Status: DISCONTINUED | OUTPATIENT
Start: 2022-09-22 | End: 2022-09-22 | Stop reason: SDUPTHER

## 2022-09-22 RX ORDER — LIDOCAINE HYDROCHLORIDE 40 MG/ML
SOLUTION TOPICAL
Status: COMPLETED | OUTPATIENT
Start: 2022-09-22 | End: 2022-09-22

## 2022-09-22 RX ORDER — IPRATROPIUM BROMIDE AND ALBUTEROL SULFATE 2.5; .5 MG/3ML; MG/3ML
1 SOLUTION RESPIRATORY (INHALATION) ONCE
Status: COMPLETED | OUTPATIENT
Start: 2022-09-22 | End: 2022-09-22

## 2022-09-22 RX ORDER — SODIUM CHLORIDE 0.9 % (FLUSH) 0.9 %
5-40 SYRINGE (ML) INJECTION PRN
Status: DISCONTINUED | OUTPATIENT
Start: 2022-09-22 | End: 2022-09-22 | Stop reason: HOSPADM

## 2022-09-22 RX ORDER — FENTANYL CITRATE 50 UG/ML
INJECTION, SOLUTION INTRAMUSCULAR; INTRAVENOUS PRN
Status: DISCONTINUED | OUTPATIENT
Start: 2022-09-22 | End: 2022-09-22 | Stop reason: SDUPTHER

## 2022-09-22 RX ORDER — ONDANSETRON 2 MG/ML
4 INJECTION INTRAMUSCULAR; INTRAVENOUS
Status: DISCONTINUED | OUTPATIENT
Start: 2022-09-22 | End: 2022-09-22 | Stop reason: HOSPADM

## 2022-09-22 RX ORDER — FENTANYL CITRATE 50 UG/ML
25 INJECTION, SOLUTION INTRAMUSCULAR; INTRAVENOUS EVERY 5 MIN PRN
Status: DISCONTINUED | OUTPATIENT
Start: 2022-09-22 | End: 2022-09-22 | Stop reason: HOSPADM

## 2022-09-22 RX ORDER — ROCURONIUM BROMIDE 10 MG/ML
INJECTION, SOLUTION INTRAVENOUS PRN
Status: DISCONTINUED | OUTPATIENT
Start: 2022-09-22 | End: 2022-09-22 | Stop reason: SDUPTHER

## 2022-09-22 RX ORDER — SODIUM CHLORIDE 0.9 % (FLUSH) 0.9 %
5-40 SYRINGE (ML) INJECTION EVERY 12 HOURS SCHEDULED
Status: DISCONTINUED | OUTPATIENT
Start: 2022-09-22 | End: 2022-09-22 | Stop reason: HOSPADM

## 2022-09-22 RX ORDER — DEXAMETHASONE SODIUM PHOSPHATE 4 MG/ML
INJECTION, SOLUTION INTRA-ARTICULAR; INTRALESIONAL; INTRAMUSCULAR; INTRAVENOUS; SOFT TISSUE PRN
Status: DISCONTINUED | OUTPATIENT
Start: 2022-09-22 | End: 2022-09-22 | Stop reason: SDUPTHER

## 2022-09-22 RX ORDER — PROPOFOL 10 MG/ML
INJECTION, EMULSION INTRAVENOUS PRN
Status: DISCONTINUED | OUTPATIENT
Start: 2022-09-22 | End: 2022-09-22 | Stop reason: SDUPTHER

## 2022-09-22 RX ORDER — LIDOCAINE HYDROCHLORIDE 20 MG/ML
INJECTION, SOLUTION EPIDURAL; INFILTRATION; INTRACAUDAL; PERINEURAL PRN
Status: DISCONTINUED | OUTPATIENT
Start: 2022-09-22 | End: 2022-09-22 | Stop reason: SDUPTHER

## 2022-09-22 RX ORDER — EPINEPHRINE 1 MG/ML
INJECTION, SOLUTION, CONCENTRATE INTRAVENOUS
Status: COMPLETED | OUTPATIENT
Start: 2022-09-22 | End: 2022-09-22

## 2022-09-22 RX ORDER — MAGNESIUM HYDROXIDE 1200 MG/15ML
LIQUID ORAL CONTINUOUS PRN
Status: COMPLETED | OUTPATIENT
Start: 2022-09-22 | End: 2022-09-22

## 2022-09-22 RX ORDER — SODIUM CHLORIDE 9 MG/ML
INJECTION, SOLUTION INTRAVENOUS CONTINUOUS
Status: DISCONTINUED | OUTPATIENT
Start: 2022-09-22 | End: 2022-09-22 | Stop reason: HOSPADM

## 2022-09-22 RX ORDER — ALBUTEROL SULFATE 2.5 MG/3ML
2.5 SOLUTION RESPIRATORY (INHALATION) ONCE
Status: COMPLETED | OUTPATIENT
Start: 2022-09-22 | End: 2022-09-22

## 2022-09-22 RX ADMIN — ROCURONIUM BROMIDE 20 MG: 10 INJECTION, SOLUTION INTRAVENOUS at 14:29

## 2022-09-22 RX ADMIN — ROCURONIUM BROMIDE 30 MG: 10 INJECTION, SOLUTION INTRAVENOUS at 14:43

## 2022-09-22 RX ADMIN — PROPOFOL 200 MG: 10 INJECTION, EMULSION INTRAVENOUS at 14:15

## 2022-09-22 RX ADMIN — ROCURONIUM BROMIDE 10 MG: 10 INJECTION, SOLUTION INTRAVENOUS at 14:35

## 2022-09-22 RX ADMIN — ONDANSETRON 4 MG: 2 INJECTION INTRAMUSCULAR; INTRAVENOUS at 14:22

## 2022-09-22 RX ADMIN — IPRATROPIUM BROMIDE AND ALBUTEROL SULFATE 1 AMPULE: .5; 3 SOLUTION RESPIRATORY (INHALATION) at 15:45

## 2022-09-22 RX ADMIN — ALBUTEROL SULFATE 2.5 MG: 2.5 SOLUTION RESPIRATORY (INHALATION) at 15:50

## 2022-09-22 RX ADMIN — LIDOCAINE HYDROCHLORIDE 100 MG: 20 INJECTION, SOLUTION EPIDURAL; INFILTRATION; INTRACAUDAL; PERINEURAL at 14:15

## 2022-09-22 RX ADMIN — SODIUM CHLORIDE: 9 INJECTION, SOLUTION INTRAVENOUS at 12:34

## 2022-09-22 RX ADMIN — Medication 120 MG: at 14:15

## 2022-09-22 RX ADMIN — FENTANYL CITRATE 50 MCG: 50 INJECTION INTRAMUSCULAR; INTRAVENOUS at 14:15

## 2022-09-22 RX ADMIN — SUGAMMADEX 400 MG: 100 INJECTION, SOLUTION INTRAVENOUS at 15:16

## 2022-09-22 RX ADMIN — DEXAMETHASONE SODIUM PHOSPHATE 10 MG: 4 INJECTION, SOLUTION INTRAMUSCULAR; INTRAVENOUS at 14:15

## 2022-09-22 RX ADMIN — ROCURONIUM BROMIDE 20 MG: 10 INJECTION, SOLUTION INTRAVENOUS at 14:32

## 2022-09-22 RX ADMIN — FENTANYL CITRATE 50 MCG: 50 INJECTION INTRAMUSCULAR; INTRAVENOUS at 14:23

## 2022-09-22 ASSESSMENT — LIFESTYLE VARIABLES: SMOKING_STATUS: 1

## 2022-09-22 ASSESSMENT — PAIN - FUNCTIONAL ASSESSMENT: PAIN_FUNCTIONAL_ASSESSMENT: NONE - DENIES PAIN

## 2022-09-22 ASSESSMENT — ENCOUNTER SYMPTOMS: SHORTNESS OF BREATH: 1

## 2022-09-22 NOTE — PROGRESS NOTES
Ambulatory Surgery/Procedure Discharge Note    Vitals:    09/22/22 1815   BP: (!) 140/85   Pulse: 94   Resp: 23   Temp: 99.4 °F (37.4 °C)   SpO2: 93%       In: 200 [I.V.:200]  Out: 250 [Urine:250]    Restroom use offered before discharge. Yes    Pain assessment:  none     Right arm infiltrate site improved. No problems, no complaints. Patient discharged to home/self care.  Patient discharged via wheel chair by transporter to waiting family/S.O.       9/22/2022 6:26 PM

## 2022-09-22 NOTE — OP NOTE
Patient Name: Mahendra Sullivan  YOB: 1960  Medical Record Number:  [id number]  Billing Number:  [id number]  Date of Procedure: 9/22/2022  Time: 9503    Pre Operative Diagnoses:    Left true vocal cord lesion  Dysphonia  Tobacco and alcohol abuse. Post Operative Diagnoses: Same. Procedure:    1. Direct suspension laryngoscopy with biopsy and use of operating microscope  2. Flexible esophagoscopy, diagnostic  3. Flexible bronchoscopy, diagnostic           Surgeon: Kiko Serna MD  Assistant: None. Anesthesia:  General anesthesia. Findings:    1) The exposure was good  2) The oral cavity and oropharynx appeared free of lesions, there was no lesions or masses in the pharyx and base of tongue  3) The supraglottis appeared clear, there were no lesions evident on the vallecula, epiglottis, arytenoids and false vocal cords  4) The pyriform sinuses and postcricoid area appeared free of lesions  4) There was a large exophytic lesion involving the entire length of the left true vocal cord with submucosal infiltration laterally and involvement at the anterior commissure. There were reactive changes to the right true vocal cord  5) On bronchoscopy the subglottis and the trachea  down to the tavon, right and left mainstem takeoffs were free of lesions  6) Esophagoscopy showed no evidence of underlying malignancy. Indications:  Laura Frazier is a now 57 y.o. male with a history of 9 months of dysphonia noted to have a large left true vocal cord lesion. Description: After verification of informed consent, the patient was brought to the operating room and placed in the supine position. General anesthesia was induced. The  Dedo laryngoscope with dental guard was used to expose the larynx the supraglottis and glottis as described. A thorough evaluation of the oral cavity, vallecula, base of tongue, bilateral tonsils, true and false vocal cords and bilateral pyriform sinuses was performed.   The patient was then placed into suspension and the microscope was brought into the field. There is noted to be a large mass emanating from the left true vocal cord. The ventricle was examined and found to be free of mass. There was some submucosal extension towards the paraglottic space on the left. At that point excision was performed of the left true vocal cord tumor with cup forceps at multiple sites. 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 I then performed a flexible bronchoscopy. The patient was placed into suspension and the cuff was deflated. The flexible bronchoscope was advanced through the trachea. There was noted to be in a shape deformity of the entire length of the trachea as well as some thick secretions. The right and left mainstem bronchi were examined. The main takeoffs were examined. There was noted to be copious secretions which were suctioned. There is no underlying mucosal lesions. Bronchoscope was then withdrawn and the flexible cuff was inflated. Performed flexible esophagoscopy. The esophagoscope was inserted into the postcricoid space and advanced through the gastroesophageal junction into the stomach. The stomach was gently insufflated and the pyloric outlet was visualized and free of lesions. The entirety of the stomach was then visualized and then the scope was retroflexed and used to visualize the gastroesophageal junction. There is no evidence of underlying mucosal lesions. The stomach was then deflated. The scope was then withdrawn using gentle puffs of insufflation to visualize the entire length of the esophagus. There was no noted mucosal lesions. It should be noted that at the beginning of the case, there was leakage from an IV into the patient's forearm that contained rocuronium. As such, the patient was extubated without difficulty and will be monitored in the PACU area to ensure that he does not develop delayed paralysis. This was discussed with the anesthesia team and the patient's partner. The patient was then turned back to the care of Anesthesia to recover. The patient tolerated the procedure well and was transferred to the recovery room in stable condition. Specimens:   ID Type Source Tests Collected by Time Destination   A : A. Left vocal cord lesion Tissue Tissue SURGICAL PATHOLOGY Talia Vallejo MD 9/22/2022 1447    B : Left vocal cord lesion Tissue Tissue SURGICAL PATHOLOGY Talia Vallejo MD 9/22/2022 1513      Estimated Blood Loss: Minimal  Complications:  None. Disposition/Plan: Home.

## 2022-09-22 NOTE — DISCHARGE INSTRUCTIONS
Post-op Laryngoscopy Instructions    GENERAL  Please observe voice rest for 1 week after surgery. Hoarseness may persist for 2-3 week after surgery. This is part of the normal healing process. Speak in a normal voice. DO NOT whisper this causes greater stress on your voice  Avoid excessive coughing or throat clearing  DO NOT SMOKE     DIET   It is very important to drink plenty of fluids. Dehydration and not swallowing increase the pain and prolong the healing process. Soft foods are preferable. Avoid hot, spicy or acidic foods. Carbonated beverages tend to be uncomfortable. FEVER   Low grade fever up to 101 is normal within the first 48 hours. Fever greater than 101 should be addressed by calling the number above    BLEEDING   Small amount of blood tinged secretions in your saliva is common during the first 24 hours. ACTIVITY   1 week off school/work is required following surgery. NO strenuous activity, sports, physical education or heavy lifting for 2 weeks. MEDICATIONS   Take 500 mg of acetaminophen combined with 200 mg of ibuprofen every 8 hours as needed for pain. Use the narcotic pain prescription for breakthrough pain. ADDITIONAL INFO   Sleep for the first few nights with your head elevated, this will help with swelling. Cool vapor bedside humidification is advisable if available. Ice packs to the neck as needed.   CALL:  With any questions or concerns regarding your post-operative course

## 2022-09-22 NOTE — ANESTHESIA POSTPROCEDURE EVALUATION
[facility] Department of Anesthesiology  Post-Anesthesia Note       Name:  Danis Pizarro                                  Age:  57 y.o. MRN:  [id number]     Last Vitals & Oxygen Saturation: BP (!) 140/85   Pulse 94   Temp 99.4 °F (37.4 °C)   Resp 23   Ht 5' 8\" (1.727 m)   Wt 134 lb (60.8 kg)   SpO2 93%   BMI 20.37 kg/m²   Patient Vitals for the past 4 hrs:   BP Temp Temp src Pulse Resp SpO2   09/22/22 1815 (!) 140/85 99.4 °F (37.4 °C) -- 94 23 93 %   09/22/22 1800 123/78 -- -- 86 25 94 %   09/22/22 1745 (!) 133/94 -- -- 92 19 93 %   09/22/22 1730 119/82 -- -- 83 22 91 %   09/22/22 1715 121/86 -- -- 84 22 92 %   09/22/22 1700 (!) 151/80 -- -- 83 27 91 %   09/22/22 1645 121/86 -- -- 91 25 93 %   09/22/22 1630 131/88 -- -- 82 23 92 %   09/22/22 1622 -- -- -- 84 -- --   09/22/22 1615 (!) 133/100 -- -- 81 21 98 %   09/22/22 1600 (!) 152/88 -- -- 82 17 100 %   09/22/22 1555 (!) 151/89 -- -- (!) 111 25 93 %   09/22/22 1550 -- -- -- -- -- 94 %   09/22/22 1545 (!) 133/98 -- -- -- -- 93 %   09/22/22 1540 125/87 -- -- (!) 146 30 100 %   09/22/22 1535 -- -- -- 96 20 99 %   09/22/22 1531 (!) 145/103 98.6 °F (37 °C) Temporal -- -- 99 %       Level of consciousness:  Awake, alert to baseline    Respiratory: Respirations easy, no distress. Stable. Cardiovascular: Hemodynamically stable. Hydration: Adequate. PONV: Adequately managed. Post-op pain: Adequately controlled. Post-op assessment: Tolerated anesthetic well without complication. Complications:  None. During case, patient with infiltrated iv after rocuronium bolus. 400 mg sugammadex given intraoperatively. Patient monitored in recovery for 4 hours after rocuronium bolus. No issues in recovery. At discharge patient strong 4/4 twitches throughout. Strong  strength bilaterally. No perceived weakness per patient.   Educated patient that if he experiences any amount of weakness or increased shortness of breath that he needs to get to the closest Emergency room immediately. He expressed understanding. Questions and concerns addressed.     Saturnino Tanner MD  September 22, 2022   6:42 PM

## 2022-09-22 NOTE — ANESTHESIA PRE PROCEDURE
Department of Anesthesiology  Preprocedure Note       Name:  Stef Ear   Age:  57 y.o.  :  1960                                          MRN:  [id number]         Date:  2022      Surgeon: Yanely Magaña):  Claudine Martinez MD    Procedure: Procedure(s): MICRODIRECT LARYNGOSCOPY WITH BIOPSY FLEXIBLE ESOPHAGOSCOPY AND BRONCHOSCOPY    Medications prior to admission:   Prior to Admission medications    Medication Sig Start Date End Date Taking? Authorizing Provider   SYMBICORT 160-4.5 MCG/ACT AERO 2 puffs 2 times daily Take your inhaler as directed the DOS and bring with you DOS. 22   Historical Provider, MD   tiotropium (SPIRIVA RESPIMAT) 2.5 MCG/ACT AERS inhaler Inhale into the lungs daily 3/8/22   Historical Provider, MD   albuterol sulfate  (90 Base) MCG/ACT inhaler Take your inhaler as directed the DOS and bring with you DOS. 3/29/21   Historical Provider, MD   fluticasone-salmeterol (ADVAIR) 250-50 MCG/DOSE AEPB Inhale 1 puff into the lungs every 12 hours 3/31/21   Charli Álvarez MD       Current medications:    Current Facility-Administered Medications   Medication Dose Route Frequency Provider Last Rate Last Admin    0.9 % sodium chloride infusion   IntraVENous Continuous Marlyn Fan  mL/hr at 22 1234 New Bag at 22 1234    sodium chloride flush 0.9 % injection 5-40 mL  5-40 mL IntraVENous 2 times per day Marlyn Fan MD        sodium chloride flush 0.9 % injection 5-40 mL  5-40 mL IntraVENous PRN Marlyn Fan MD        0.9 % sodium chloride infusion   IntraVENous PRN Marlyn Fan MD           Allergies:     Allergies   Allergen Reactions    Codeine Shortness Of Breath and Nausea And Vomiting       Problem List:    Patient Active Problem List   Diagnosis Code    Strain of left levator scapulae muscle S46.812A    Shortness of breath R06.02    Other fatigue R53.83    Cigarette nicotine dependence with nicotine-induced disorder F17.219    Acute asthmatic bronchitis J45.909    Colonoscopy refused Z53.20       Past Medical History:        Diagnosis Date    Asthma     COPD (chronic obstructive pulmonary disease) ([facility] [address]        Past Surgical History:        Procedure Laterality Date    FINGER AMPUTATION  2016    right ring finger    WRIST SURGERY Right        Social History:    Social History     Tobacco Use    Smoking status: Every Day     Packs/day: 1.00     Years: 40.00     Pack years: 40.00     Types: Cigarettes    Smokeless tobacco: Never    Tobacco comments:     Trying to quit down to 1/2 ppd   Substance Use Topics    Alcohol use: Yes     Comment: 2-3 beer a day                                Ready to quit: Not Answered  Counseling given: Not Answered  Tobacco comments: Trying to quit down to 1/2 ppd      Vital Signs (Current):   Vitals:    09/19/22 1621 09/22/22 1229 09/22/22 1244   BP:  (!) 139/96    Pulse:  77    Resp:  18    Temp:  97 °F (36.1 °C)    TempSrc:  Temporal    SpO2:  97%    Weight: 134 lb (60.8 kg)  134 lb (60.8 kg)   Height: 5' 8\" (1.727 m)                                                BP Readings from Last 3 Encounters:   09/22/22 (!) 139/96   09/19/22 (!) 138/91   08/29/22 (!) 128/90       NPO Status:                            >8hrs                                                       BMI:   Wt Readings from Last 3 Encounters:   09/22/22 134 lb (60.8 kg)   03/31/21 130 lb 9.6 oz (59.2 kg)     Body mass index is 20.37 kg/m². CBC: No results found for: WBC, RBC, HGB, HCT, MCV, RDW, PLT    CMP:   Lab Results   Component Value Date/Time    CREATININE 0.6 09/12/2022 03:17 PM    GFRAA >60 09/12/2022 03:17 PM    LABGLOM >60 09/12/2022 03:17 PM       POC Tests: No results for input(s): POCGLU, POCNA, POCK, POCCL, POCBUN, POCHEMO, POCHCT in the last 72 hours.     Coags: No results found for: PROTIME, INR, APTT    HCG (If Applicable): No results found for: PREGTESTUR, PREGSERUM, HCG, HCGQUANT     ABGs: No results found for: PHART, PO2ART, JLQ9AZF, PDL1WWR, BEART, G2RQEUZZ     Type & Screen (If Applicable):  No results found for: LABABO, LABRH    Drug/Infectious Status (If Applicable):  No results found for: HIV, HEPCAB    COVID-19 Screening (If Applicable): No results found for: COVID19        Anesthesia Evaluation  Patient summary reviewed no history of anesthetic complications:   Airway: Mallampati: II  TM distance: >3 FB   Neck ROM: full  Mouth opening: > = 3 FB   Dental:      Comment: No loose teeth    Pulmonary:   (+) COPD:  shortness of breath:  wheezes current smoker          Patient smoked on day of surgery. ROS comment: dysphonia  PE comment: Patient used inhalers at home this AM. States has wheezing at baseline  Cardiovascular:        (-) hypertension, valvular problems/murmurs, past MI, CAD, CABG/stent, dysrhythmias,  angina,  CHF and no pulmonary hypertension      Rhythm: regular  Rate: normal                    Neuro/Psych:   (+) neuromuscular disease:, psychiatric history:            GI/Hepatic/Renal:        (-) GERD, PUD, hepatitis, liver disease, no renal disease and bowel prep       Endo/Other:    (+) malignancy/cancer (probable cancer). (-) diabetes mellitus, hypothyroidism, hyperthyroidism, blood dyscrasia, arthritis               Abdominal:             Vascular: Other Findings:           Anesthesia Plan      general     ASA 3       Induction: intravenous. MIPS: Postoperative opioids intended and Prophylactic antiemetics administered. Anesthetic plan and risks discussed with patient. Plan discussed with CRNA. This pre-anesthesia assessment may be used as a history and physical.    DOS STAFF ADDENDUM:    Pt seen and examined, chart reviewed (including anesthesia, drug and allergy history). No interval changes to history and physical examination. Anesthetic plan, risks, benefits, alternatives, and personnel involved discussed with patient.   Patient verbalized an understanding and agrees to

## 2022-09-22 NOTE — PROGRESS NOTES
Patient admitted to PACU # 13 from OR at 1531 post [facility] per Dr. Jermaine Mccarty. Attached to PACU monitoring system and report received from anesthesia provider. Patient was reported to be hemodynamically stable during procedure. Patient awake on arrival but coughing and difficulty breathing. Lungs sounds tight with no air movement. Dr. Burak Juan aware, breathing treatment ordered. Now on room air. Raspy voice, VSS. Reported on arrival IV infiltrate with Rocuronium. Sugamadex given and will need to stay in PACU until 1830 per Dr. Burak Juan.  Will contact patient's family member

## 2022-09-22 NOTE — H&P
The informed consent process includes a discussion about the following:  - The patient's proposed care, treatment, and services. - Potential benefits, risks, and side effects of the patient's proposed care, treatment, and services; the likelihood of the patient achieving his or her goals; and any potential problems that might occur during recuperation.  - Reasonable alternatives to the patient's proposed care, treatment, and services. The discussion encompasses risks, benefits, and side effects related to the alternatives and the risks related to not receiving the proposed care, treatment, and services    -Risks and benefits of direct laryngoscopy and biopsy under general anesthesia include injury to the oral, oropharyngeal or laryngeal structures (teeth, lips, tongue, mucous membranes), hemorrhage, dysphagia/odynophagia, voice changes, need for further biopsies outlined.  Patient in agreement with plan for procedure  -General anesthesia carries independent risks which will be discussed with Anesthesiology on day of procedure

## 2022-09-26 ENCOUNTER — OFFICE VISIT (OUTPATIENT)
Dept: ENT CLINIC | Age: 62
End: 2022-09-26
Payer: COMMERCIAL

## 2022-09-26 VITALS
OXYGEN SATURATION: 93 % | HEART RATE: 69 BPM | SYSTOLIC BLOOD PRESSURE: 145 MMHG | TEMPERATURE: 98.6 F | DIASTOLIC BLOOD PRESSURE: 93 MMHG | BODY MASS INDEX: 20.37 KG/M2 | HEIGHT: 68 IN

## 2022-09-26 DIAGNOSIS — J44.9 CHRONIC OBSTRUCTIVE PULMONARY DISEASE, UNSPECIFIED COPD TYPE (HCC): ICD-10-CM

## 2022-09-26 DIAGNOSIS — Z72.0 TOBACCO ABUSE: ICD-10-CM

## 2022-09-26 DIAGNOSIS — R49.0 DYSPHONIA: ICD-10-CM

## 2022-09-26 DIAGNOSIS — C14.0 THROAT CANCER (HCC): ICD-10-CM

## 2022-09-26 DIAGNOSIS — C32.9 LARYNGEAL CANCER (HCC): Primary | ICD-10-CM

## 2022-09-26 DIAGNOSIS — J38.3 VOCAL CORD MASS: Primary | ICD-10-CM

## 2022-09-26 PROCEDURE — 3023F SPIROM DOC REV: CPT | Performed by: STUDENT IN AN ORGANIZED HEALTH CARE EDUCATION/TRAINING PROGRAM

## 2022-09-26 PROCEDURE — G8428 CUR MEDS NOT DOCUMENT: HCPCS | Performed by: STUDENT IN AN ORGANIZED HEALTH CARE EDUCATION/TRAINING PROGRAM

## 2022-09-26 PROCEDURE — 3017F COLORECTAL CA SCREEN DOC REV: CPT | Performed by: STUDENT IN AN ORGANIZED HEALTH CARE EDUCATION/TRAINING PROGRAM

## 2022-09-26 PROCEDURE — 99213 OFFICE O/P EST LOW 20 MIN: CPT | Performed by: STUDENT IN AN ORGANIZED HEALTH CARE EDUCATION/TRAINING PROGRAM

## 2022-09-26 PROCEDURE — G8420 CALC BMI NORM PARAMETERS: HCPCS | Performed by: STUDENT IN AN ORGANIZED HEALTH CARE EDUCATION/TRAINING PROGRAM

## 2022-09-26 PROCEDURE — 4004F PT TOBACCO SCREEN RCVD TLK: CPT | Performed by: STUDENT IN AN ORGANIZED HEALTH CARE EDUCATION/TRAINING PROGRAM

## 2022-09-26 NOTE — PROGRESS NOTES
P.O. Bri Levin (:  1960) is a 57 y.o. male, here for evaluation of the following chief complaint(s): Other (C/O fatigue , hoarseness )      ASSESSMENT/PLAN:  1. Laryngeal cancer ([facility] [address]  2. Dysphonia  3. Chronic obstructive pulmonary disease, unspecified COPD type ([facility] [address]  4. Tobacco abuse      This is a very pleasant 57 y.o. male here today for evaluation of the the above-noted complaints. The patient is status post panendoscopy and biopsy of his left vocal cord lesion. 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 CT chest and neck reviewed. There is a small subcentimeter nodule and extensive COPD changes to the lungs. I discussed different treatments with the patient including doing nothing, surgery or chemoradiation. We discussed the benefits of organ sparing treatment with chemoradiation. I will refer him to my colleagues in hematology oncology and radiation oncology. We discussed smoking cessation measures. He will need to follow-up with our speech and language pathologist for treatments during his chemoradiation. Follow-up in 6 weeks or sooner if there are any new issues. Medical Decision Making: The following items were considered in medical decision making:  Independent review of images  Review / order clinical lab tests  Review / order radiology tests  Decision to obtain old records  Review and summation of old records as accessed through [facility] if applicable    SUBJECTIVE/OBJECTIVE:  JOSH Cohn is here today for evaluation of voice and nasal issues. The patient states that he has had at least 4 months of hoarseness. It is gradually getting worse. Patient gets occasional loss of voice. He denies pain with speaking. He denies dysphagia.   Patient gets some nasal obstruction and clear nasal drainage. He gets crusting from both nostrils. He denies fevers chills or night sweats. He denies unintentional weight loss. He denies neck masses. He had a CT scan done in January 2022 which showed a lesion of the vocal cord at that time. The patient, he was not informed that his CT scan showed anything on his vocal cord. The patient smokes about a half a pack per day. He has been smoking for over 40 years. He does not drink. Update 9/19/2022:    Still having difficulties with dysphonia. Still smoking and drinking. Update 9/26/2022:    Patient presents today for follow-up after undergoing surgery. He is here today to discuss his pathology results and neck steps. His biopsy was consistent with squamous cell carcinoma of the larynx. REVIEW OF SYSTEMS  The following systems were reviewed and revealed the following in addition to any already discussed in the HPI:    PHYSICAL EXAM    GENERAL: No acute distress, alert and oriented, hoarse and rough voice  EYES: EOMI, Anti-icteric  HENT:   Head: Normocephalic and atraumatic. Face:  Symmetric, facial nerve intact  Right Ear: Normal external ear, normal external auditory canal, intact tympanic membrane with normal mobility and aerated middle ear  Left Ear: Normal external ear, normal external auditory canal, intact tympanic membrane with normal mobility and aerated middle ear  Mouth/Oral Cavity:  normal lips, Uvula is midline, no mucosal lesions, no trismus, very poor dentition, normal salivary quality/flow  Oropharynx/Larynx:  normal oropharynx,    Nose:Normal external nasal appearance. Anterior rhinoscopy shows  a deviated septum preventing view posteriorly. Normal appearing turbinates.   Normal mucosa   NECK: Normal range of motion, no thyromegaly, trachea is midline, no lymphadenopathy, no neck masses, no crepitus          PROCEDURE  Nasal Endoscopy (CPT code 90480) from prior visit       Due to the patients chronic sinus disease and/or history of sinonasal neoplasm for surveillance a nasal endoscopy with or without debridement will be performed to complete a significant physical examination of the patient which cannot be performed by anterior rhinoscopy alone (failure of complete examination of the paranasal sinuses). Failure to provide this procedure may lead to late detection of significant chronic benign disease, acute exacerbation, resolution or failure of early diagnosis of recurrent cancer. The procedure report is present in the body of the chart. Verbal consent was received. After topical anesthesia and decongestion had been obtained using aerosolized 1% lidocaine and oxymetazoline, a 45 degree rigid endoscope was placed into both nares with the patient in a sitting position. The following was observed:    Septum: intact and deviated   Other:   -The inferior and middle turbinates were examined. The middle meatus, and sphenoethmoid recess was examined bilaterally.    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 The nasopharynx, oropharynx, hypopharynx, supraglottis and glottis were visualized. -Evidence of an exophytic lesion involving the entire left true cord. There was some whitish changes to the right true cord. True vocal cord motion was diminished on the left. -There were no complications. Tolerated well without complication. I attest that I was present for and did the entire procedure myself. This note was generated completely or in part utilizing Dragon dictation speech recognition software. Occasionally, words are mistranscribed and despite editing, the text may contain inaccuracies due to incorrect word recognition. If further clarification is needed please contact the office at [phone].     An electronic signature was used to authenticate this note.     --Brijesh Carrillo MD

## 2022-10-20 ENCOUNTER — CLINICAL DOCUMENTATION (OUTPATIENT)
Dept: SPEECH THERAPY | Age: 62
End: 2022-10-20

## 2022-10-20 NOTE — PROGRESS NOTES
[facility]  Cancellation/No-show Note  Name: Caro Fox  YOB: 1960  MRN: [id number]  Cancelled visits to date: 1  No-shows to date: 1     For today's appointment patient:  []  Cancelled  []  Rescheduled appointment  [x]  No-show     Reason given by patient:  []  Patient ill  []  Conflicting appointment  []  No transportation                          []  Conflict with work  [x]  No reason given  []  Other:                Comments:      Notes:  Pt scheduled to be seen for baseline evaluation of swallowing prior to starting CXRT. Will need to be throughout treatment.

## 2022-10-24 ENCOUNTER — HOSPITAL ENCOUNTER (OUTPATIENT)
Dept: SURGERY | Age: 62
Discharge: HOME | End: 2022-10-24

## 2022-10-24 ENCOUNTER — HOSPITAL ENCOUNTER (OUTPATIENT)
Dept: PET IMAGING | Age: 62
Discharge: HOME | End: 2022-10-24
Payer: COMMERCIAL

## 2022-10-24 DIAGNOSIS — C32.9 SQUAMOUS CELL CARCINOMA OF LARYNX (HCC): ICD-10-CM

## 2022-10-24 PROCEDURE — 3430000000 HC RX DIAGNOSTIC RADIOPHARMACEUTICAL: Performed by: STUDENT IN AN ORGANIZED HEALTH CARE EDUCATION/TRAINING PROGRAM

## 2022-10-24 PROCEDURE — 36569 INSJ PICC 5 YR+ W/O IMAGING: CPT

## 2022-10-24 PROCEDURE — C1751 CATH, INF, PER/CENT/MIDLINE: HCPCS

## 2022-10-24 PROCEDURE — A9552 F18 FDG: HCPCS | Performed by: STUDENT IN AN ORGANIZED HEALTH CARE EDUCATION/TRAINING PROGRAM

## 2022-10-24 PROCEDURE — 78815 PET IMAGE W/CT SKULL-THIGH: CPT

## 2022-10-24 RX ORDER — FLUDEOXYGLUCOSE F 18 200 MCI/ML
12.7 INJECTION, SOLUTION INTRAVENOUS
Status: COMPLETED | OUTPATIENT
Start: 2022-10-24 | End: 2022-10-24

## 2022-10-24 RX ADMIN — FLUDEOXYGLUCOSE F 18 12.7 MILLICURIE: 200 INJECTION, SOLUTION INTRAVENOUS at 09:22

## 2022-10-24 NOTE — CONSULTS
PICC line education:    -Risks  -Benefits  -Alternatives  -Procedure    Discussed the above with patient, verbalized understanding, answered all questions. Provided with information on PICC care to review. PICC tip verified via [employer] (Ok to use). Reported off to patient's  Nurse Holli Gilford MD office.

## 2022-10-25 ENCOUNTER — TELEPHONE (OUTPATIENT)
Dept: ENT CLINIC | Age: 62
End: 2022-10-25

## 2022-10-25 NOTE — TELEPHONE ENCOUNTER
Dr Ranjith Neal requesting call from Dr Igor Palm - non urgent, just whenever convenient    Office # [phone]  Cell # [phone]

## 2022-10-31 ENCOUNTER — TELEPHONE (OUTPATIENT)
Dept: ENT CLINIC | Age: 62
End: 2022-10-31

## 2022-11-02 ENCOUNTER — TELEPHONE (OUTPATIENT)
Dept: ENT CLINIC | Age: 62
End: 2022-11-02

## 2022-11-04 ENCOUNTER — TELEPHONE (OUTPATIENT)
Dept: ENT CLINIC | Age: 62
End: 2022-11-04

## 2022-11-07 NOTE — TELEPHONE ENCOUNTER
Spoke with Jennifer Escobar patients friend)  her Car broke down Patient has limited transportation and would like [facility] if [facility] patient is feeling worse.

## 2022-11-08 ENCOUNTER — OFFICE VISIT (OUTPATIENT)
Dept: ENT CLINIC | Age: 62
End: 2022-11-08
Payer: COMMERCIAL

## 2022-11-08 VITALS
RESPIRATION RATE: 16 BRPM | SYSTOLIC BLOOD PRESSURE: 139 MMHG | WEIGHT: 131 LBS | HEART RATE: 73 BPM | BODY MASS INDEX: 19.85 KG/M2 | DIASTOLIC BLOOD PRESSURE: 96 MMHG | HEIGHT: 68 IN

## 2022-11-08 DIAGNOSIS — C32.9 LARYNGEAL SQUAMOUS CELL CARCINOMA (HCC): ICD-10-CM

## 2022-11-08 DIAGNOSIS — R91.1 LUNG NODULE: ICD-10-CM

## 2022-11-08 DIAGNOSIS — R94.8 ABNORMAL POSITRON EMISSION TOMOGRAPHY (PET) SCAN: ICD-10-CM

## 2022-11-08 DIAGNOSIS — J35.9 LESION OF TONSIL: Primary | ICD-10-CM

## 2022-11-08 PROCEDURE — G8427 DOCREV CUR MEDS BY ELIG CLIN: HCPCS | Performed by: STUDENT IN AN ORGANIZED HEALTH CARE EDUCATION/TRAINING PROGRAM

## 2022-11-08 PROCEDURE — 3017F COLORECTAL CA SCREEN DOC REV: CPT | Performed by: STUDENT IN AN ORGANIZED HEALTH CARE EDUCATION/TRAINING PROGRAM

## 2022-11-08 PROCEDURE — 99214 OFFICE O/P EST MOD 30 MIN: CPT | Performed by: STUDENT IN AN ORGANIZED HEALTH CARE EDUCATION/TRAINING PROGRAM

## 2022-11-08 PROCEDURE — G8420 CALC BMI NORM PARAMETERS: HCPCS | Performed by: STUDENT IN AN ORGANIZED HEALTH CARE EDUCATION/TRAINING PROGRAM

## 2022-11-08 PROCEDURE — G8484 FLU IMMUNIZE NO ADMIN: HCPCS | Performed by: STUDENT IN AN ORGANIZED HEALTH CARE EDUCATION/TRAINING PROGRAM

## 2022-11-08 PROCEDURE — 31575 DIAGNOSTIC LARYNGOSCOPY: CPT | Performed by: STUDENT IN AN ORGANIZED HEALTH CARE EDUCATION/TRAINING PROGRAM

## 2022-11-08 PROCEDURE — 4004F PT TOBACCO SCREEN RCVD TLK: CPT | Performed by: STUDENT IN AN ORGANIZED HEALTH CARE EDUCATION/TRAINING PROGRAM

## 2022-11-08 NOTE — PROGRESS NOTES
History:   Procedure Laterality Date    FINGER AMPUTATION  2016    right ring finger    LARYNGOSCOPY N/A 9/22/2022    MICRODIRECT LARYNGOSCOPY WITH BIOPSY FLEXIBLE ESOPHAGOSCOPY AND BRONCHOSCOPY performed by Román Franco MD at [address] [place] [address] Drive Right        Family History     Family History   Problem Relation Age of Onset    Esophageal Cancer Mother        Social History     Social History     Tobacco Use    Smoking status: Every Day     Packs/day: 1.00     Years: 40.00     Pack years: 40.00     Types: Cigarettes    Smokeless tobacco: Never    Tobacco comments:     Trying to quit down to 1/2 ppd   Vaping Use    Vaping Use: Never used   Substance Use Topics    Alcohol use: Yes     Comment: 2-3 beer a day    Drug use: Never        Allergies     Allergies   Allergen Reactions    Codeine Shortness Of Breath and Nausea And Vomiting       Medications     Current Outpatient Medications   Medication Sig Dispense Refill    SYMBICORT 160-4.5 MCG/ACT AERO 2 puffs 2 times daily Take your inhaler as directed the DOS and bring with you DOS. tiotropium (SPIRIVA RESPIMAT) 2.5 MCG/ACT AERS inhaler Inhale into the lungs daily      albuterol sulfate  (90 Base) MCG/ACT inhaler Take your inhaler as directed the DOS and bring with you DOS. fluticasone-salmeterol (ADVAIR) 250-50 MCG/DOSE AEPB Inhale 1 puff into the lungs every 12 hours 60 each 0     No current facility-administered medications for this visit. Review of Systems     REVIEW OF SYSTEM: See HPI above    PhysicalExam     Vitals:    11/08/22 1126   BP: (!) 139/96   Pulse: 73   Resp: 16   Weight: 131 lb (59.4 kg)   Height: 5' 8\" (1.727 m)       PHYSICAL EXAM  BP (!) 139/96   Pulse 73   Resp 16   Ht 5' 8\" (1.727 m)   Wt 131 lb (59.4 kg)   BMI 19.92 kg/m²     GENERAL: No acute distress, alert and oriented. EYES: EOMI, Anti-icteric. NOSE: On anterior rhinoscopy there is no epistaxis, nasal mucosa moist and normal appearing, no purulent drainage. EARS: Normal external appearance; on portable otomicroscopy:     -Ad: External auditory canal without stenosis, tympanic membrane clear, no middle ear effusions or retractions     -As: External auditory canal without stenosis, tympanic membrane clear, no middle ear effusions or retractions  FACE: HB 1/6 bilaterally, symmetric appearing, sensation equal bilaterally  ORAL CAVITY: No masses or lesions visualized or palpated, uvula is midline, moist mucous membranes, no oropharyngeal masses or oropharyngeal obstruction. Tonsils are 2+ bilaterally, left tonsil somewhat larger than right tonsil and more erythematous and nodular appearing. NECK: Normal range of motion, no thyromegaly, trachea is midline, no palpable lymphadenopathy or neck masses, no crepitus  CHEST: Normal respiratory effort, breathing comfortably, no retractions  SKIN: No rashes, normal appearing skin, no evidence of skin lesions/tumors  NEURO: Cranial Nerves 2, 3, 4, 5, 6, 7, 11, 12 grossly intact bilaterally     I have performed a head and neck physical exam personally or was physically present during the key or critical portions of the service. Procedure     Procedure: Flexible Laryngoscopy    Pre-op: Abnormal PET scan, left tonsil lesion  Post-op: Same  Procedure : Flexible Nasopharyngolaryngoscopy  Surgeon: Dr. Josh Ibarra,   Anesthesia: Afrin with 2% lidocaine  Estimated Blood Loss: None    Description of Procedure:  After obtaining consent, the patient was placed in the examination chair in the upright position. Decongestant and topical anesthetic was sprayed in the bilateral nares and after allowing adequate time for hemostatic effect, the flexible 4 mm laryngoscope was passed via the left nasal passage.     Nasal Septum: No acute septal deviation, no septal hematoma or perforations noted   Nasal Findings: No active hemorrhage, no nasal masses appreciated   Nasopharynx: Clear, no masses or inflammation  Oropharynx: Bilateral tonsils visualized. Left tonsil with marked diffuse erythema. Tonsils nonobstructive. Base of Tongue: Left base of tongue and lingual with some subtle increased fullness although vallecula without effacement  Epiglottis: Upright, in normal anatomical position  True Vocal Cord: Exophytic nonobstructive white mass lesion along the entire length of the left true vocal fold. True vocal fold motion on the left diminished. Right true vocal fold without exophytic lesion, right true vocal fold with normal abduction and adduction. False Vocal Cord: normal appearing without masses  Hypopharynx Mucosa: No masses or inflammation of the piriform sinuses or postcricoid area  Arytenoids: Normal mucosa, no dislocation appreciated     * Patient tolerated the procedure well with no complications   * Patient was instructed not to eat for 30 minutes following procedure. * Patient was instructed that they may notice minor bleeding. Attestation:   I was present for the entire viewing, including introduction and removal of the scope. Data/Imaging Review     EXAMINATION:   WHOLE BODY PET/CT       10/24/2022       TECHNIQUE:   Following IV injection of 12.7 mCi of F-18 FDG, PET  tumor imaging was   acquired from the base of the skull to the mid thighs. Computed tomography   was used for purposes of attenuation correction and anatomic localization. Fusion imaging was utilized for interpretation. Uptake time 60 to 90 min. Glucose level 96 mg/dl. COMPARISON:   September 12, 2022. HISTORY:   ORDERING SYSTEM PROVIDED HISTORY: Squamous cell carcinoma of larynx (HCC)       FINDINGS:   HEAD/NECK: No pathologically enlarged or hypermetabolic cervical   lymphadenopathy. Physiologic uptake is identified in the parotid and   submandibular glands. Solid-appearing left vocal cord mass measures approximately 17 x 9 mm with a   maximum SUV of 9.3 (SL-188.6).        Additionally, there is slight interval fullness of the left palatine tonsil   extending to the glossotonsillar sulcus since CT dated September 12, 2022. Maximum SUV measures 5.7 compared to the contralateral right side of 4.2. CHEST:       Mediastinum: The heart is normal in size without a pericardial effusion. Minimal coronary artery atherosclerosis. The aorta, arch vessels, and main   pulmonary artery are normal in course and caliber. No pathologically enlarged or hypermetabolic mediastinal or hilar lymph nodes. Lungs:  Emphysematous changes are present. No focal consolidation or pleural   effusion. The central airways are patent. Diffuse bronchial wall   thickening. No significant bronchiectasis. No hypermetabolic or pathologically enlarged lymph nodes. Soft tissues: Normal.       ABDOMEN/PELVIS:       Organs: The liver, gallbladder, spleen, pancreas, adrenal glands, and   kidneys are unremarkable. Physiologic uptake is identified in the collecting   system and bladder. GI:  The stomach is unremarkable. The small and large bowel are normal in   course and caliber without evidence of wall thickening or obstruction. Severe diverticulosis without evidence of acute diverticulitis. Normal   appendix. Pelvis: Prostate is unremarkable. Peritoneal/ retroperitoneal: No free fluid or free air. No hypermetabolic or   pathologically enlarged lymph nodes throughout the abdomen or pelvis. The aorta is normal in course and caliber with mild atherosclerosis. BONES/SOFT TISSUE: No acute or aggressive osseous lesion. Impression   1. FDG uptake of the left vocal cord compatible with the known malignancy. 2. Interval appearance since CT neck dated September 12, 2022 of fullness and   asymmetric FDG uptake involving the left palatine tonsil with extension to   the glossotonsillar sulcus. 2nd primary malignancy remains a differential   consideration.   Recommend repeat CT neck with IV contrast and/or second-look   direct visualization. 3. No hypermetabolic lymphadenopathy in the neck. 4. No evidence of hypermetabolic malignancy in the chest, abdomen or pelvis. 5. Emphysema. 6. Diverticulosis. [facility]                                        [facility] [address]                                        Fax [phone]   . [phone]   Department of Pathology   FINAL SURGICAL PATHOLOGY REPORT   Patient Name:  Hoa Bynum             Accession No:  [id number]    Age Sex:   1960    57 Y / M      Location:      [facility]   Account No:    [de-identified]                 Collected:     2022   Med Rec No:    [id number]                Received:      2022   Attend Phys:   Mitul Sotelo MD              Completed:     2022   Perform Phys:  Mitul Sotelo MD      FINAL DIAGNOSIS:        A. Vocal cord lesion (frozen), left, biopsy:        - Well differentiated squamous cell carcinoma        B. Vocal cord lesion, left, biopsy:        - Invasive well differentiated squamous cell carcinoma     Assessment and Plan     1. Lesion of tonsil  2. Abnormal positron emission tomography (PET) scan  -Increased metabolic uptake in left tonsil extending towards the base of tongue on most recent PET scan. Upon direct visualization on transoral examination and flexible endoscopy noted to have nodular changes of the left palatine tonsil with erythema as well as some fullness within the left vallecula/base of tongue. Plan for tonsillectomy and direct laryngoscopy with biopsies, possible rigid esophagoscopy. Description of the procedure as well as the associated risks, benefits, alternatives discussed in detail with the patient. Discussed risk included but were not limited to postoperative hemorrhage, damage to surrounding structures, infection, nerve damage, tongue numbness, broken teeth, anesthesia risk, death.   All questions were answered consent was signed in the office today. Plan for surgery within the next 1 to 2 weeks as he is set to start chemoradiation soon and findings from biopsy could certainly augment changes in therapy. -PCP for preoperative clearance    3. Laryngeal squamous cell carcinoma (HCC)  -Follows with Dr. Layla Ordonez and Dr. Cornelius Conti. He is already been fitted for his radiotherapy mass. We will initiate chemotherapy after biopsy results    4. Lung nodule  -Referral to pulmonology at [facility]  - John Dang MD, Pulmonary, [facility]    Follow-Up     Return for post operative visit. Dr. Julian Carvajal [place] [address]  Department of Otolaryngology/Head and Neck Surgery  11/8/22    Medical Decision Making: The following items were considered in medical decision making:  Independent review of images  Review / order clinical lab tests  Review / order radiology tests  Decision to obtain old records      This note was generated completely or in part utilizing Dragon dictation speech recognition software. Occasionally, words are mistranscribed and despite editing, the text may contain inaccuracies due to incorrect word recognition. If further clarification is needed please contact the office at [phone].

## 2022-11-15 NOTE — PROGRESS NOTES
Name_______________________________________Printed:____________________  Date and time of surgery___11/18/2022________1300_____________Arrival Time:______1130__________   1. The instructions given regarding when and if a patient needs to stop oral intake prior to surgery varies. Follow the specific instructions you were given                  __x_Nothing to eat or to drink after Midnight the night before.                   ____Carbo loading or ERAS instructions will be given to select patients-if you have been given those instructions -please do the following                           The evening before your surgery after dinner before midnight drink 40 ounces of gatorade. If you are diabetic use sugar free. The morning of surgery drink 40 ounces of water. This needs to be finished 3 hours prior to your surgery start time. 2. Take the following pills with a small sip of water on the morning of surgery___________________________________________________                  Do not take blood pressure medications ending in pril or sartan the dorothea prior to surgery or the morning of surgery_   3. Aspirin, Ibuprofen, Advil, Naproxen, Vitamin E and other Anti-inflammatory products and supplements should be stopped for 5 -7days before surgery or as directed by your physician. 4. Check with your Doctor regarding stopping Plavix, Coumadin,Eliquis, Lovenox,Effient,Pradaxa,Xarelto, Fragmin or other blood thinners and follow their instructions. 5. Do not smoke, and do not drink any alcoholic beverages 24 hours prior to surgery. This includes NA Beer. Refrain from the usage of any recreational drugs. 6. You may brush your teeth and gargle the morning of surgery. DO NOT SWALLOW WATER   7. You MUST make arrangements for a responsible adult to stay on site while you are here and take you home after your surgery. You will not be allowed to leave alone or drive yourself home.   It is strongly suggested someone stay with you the first 24 hrs. Your surgery will be cancelled if you do not have a ride home. 8. A parent/legal guardian must accompany a child scheduled for surgery and plan to stay at the hospital until the child is discharged. Please do not bring other children with you. 9. Please wear simple, loose fitting clothing to the hospital.  Eva Burleson not bring valuables (money, credit cards, checkbooks, etc.) Do not wear any makeup (including no eye makeup) or nail polish on your fingers or toes. 10. DO NOT wear any jewelry or piercings on day of surgery. All body piercing jewelry must be removed. 11. If you have ___dentures, they will be removed before going to the OR; we will provide you a container. If you wear ___contact lenses or ___glasses, they will be removed; please bring a case for them. 12. Please see your family doctor/pediatrician for a history & physical and/or concerning medications. Bring any test results/reports from your physician's office. PCP__________________Phone___________H&P Appt. Date________             13 If you  have a Living Will and Durable Power of  for Healthcare, please bring in a copy. 15. Notify your Surgeon if you develop any illness between now and surgery  time, cough, cold, fever, sore throat, nausea, vomiting, etc.  Please notify your surgeon if you experience dizziness, shortness of breath or blurred vision between now & the time of your surgery             15. DO NOT shave your operative site 96 hours prior to surgery. For face & neck surgery, men may use an electric razor 48 hours prior to surgery. 16. Shower the night before or morning of surgery using an antibacterial soap or as you have been instructed. 17. To provide excellent care visitors will be limited to one in the room at any given time. 18.  Please bring picture ID and insurance card.              19.  Visit our web site for additional information:  [facility] - Biomolecular Determination/patient-eprep              20.During flu season no children under the age of 15 are permitted in the hospital for the safety of all patients. 21. If you take a long acting insulin in the evening only  take half of your usual  dose the night  before your procedure              22. If you use a c-pap please bring DOS if staying overnight,             23.For your convenience [facility] has a pharmacy on site to fill your prescriptions. 24. If you use oxygen and have a portable tank please bring it  with you the DOS             25. Bring a complete list of all your medications with name and dose include any supplements. 26. Other__________________________________________   *Please call pre admission testing if you any further questions   [address]    [place] [address]. Air  [phone]   [address] [place] [address]       VISITOR POLICY(subject to change)    Current policy is 2 visitors per patient. No children. Mask is  at the discretion of the facility. Visiting hours are 8a-8p. Overnight visitors will be at the discretion of the nurse. All policies subject to change. All above information reviewed with patient in person or by phone. Patient verbalizes understanding. All questions and concerns addressed.                                                                                                  Patient/Rep_____patient_______________                                                                                                                                    PRE OP INSTRUCTIONS

## 2022-11-18 ENCOUNTER — ANESTHESIA EVENT (OUTPATIENT)
Dept: OPERATING ROOM | Age: 62
End: 2022-11-18
Payer: COMMERCIAL

## 2022-11-18 ENCOUNTER — HOSPITAL ENCOUNTER (OUTPATIENT)
Age: 62
Setting detail: OUTPATIENT SURGERY
Discharge: HOME | End: 2022-11-18
Attending: STUDENT IN AN ORGANIZED HEALTH CARE EDUCATION/TRAINING PROGRAM | Admitting: STUDENT IN AN ORGANIZED HEALTH CARE EDUCATION/TRAINING PROGRAM
Payer: COMMERCIAL

## 2022-11-18 ENCOUNTER — ANESTHESIA (OUTPATIENT)
Dept: OPERATING ROOM | Age: 62
End: 2022-11-18
Payer: COMMERCIAL

## 2022-11-18 VITALS
HEART RATE: 85 BPM | SYSTOLIC BLOOD PRESSURE: 143 MMHG | RESPIRATION RATE: 18 BRPM | BODY MASS INDEX: 19.55 KG/M2 | TEMPERATURE: 97.1 F | OXYGEN SATURATION: 91 % | HEIGHT: 68 IN | DIASTOLIC BLOOD PRESSURE: 90 MMHG | WEIGHT: 129 LBS

## 2022-11-18 DIAGNOSIS — G89.18 ACUTE POST-OPERATIVE PAIN: Primary | ICD-10-CM

## 2022-11-18 DIAGNOSIS — J35.9 LESION OF TONSIL: ICD-10-CM

## 2022-11-18 DIAGNOSIS — R94.8 ABNORMAL GASTROINTESTINAL POSITRON EMISSION TOMOGRAPHY (PET) SCAN: ICD-10-CM

## 2022-11-18 PROCEDURE — 3700000000 HC ANESTHESIA ATTENDED CARE: Performed by: STUDENT IN AN ORGANIZED HEALTH CARE EDUCATION/TRAINING PROGRAM

## 2022-11-18 PROCEDURE — 42826 REMOVAL OF TONSILS: CPT | Performed by: STUDENT IN AN ORGANIZED HEALTH CARE EDUCATION/TRAINING PROGRAM

## 2022-11-18 PROCEDURE — 6360000002 HC RX W HCPCS: Performed by: STUDENT IN AN ORGANIZED HEALTH CARE EDUCATION/TRAINING PROGRAM

## 2022-11-18 PROCEDURE — 2709999900 HC NON-CHARGEABLE SUPPLY: Performed by: STUDENT IN AN ORGANIZED HEALTH CARE EDUCATION/TRAINING PROGRAM

## 2022-11-18 PROCEDURE — A4217 STERILE WATER/SALINE, 500 ML: HCPCS | Performed by: STUDENT IN AN ORGANIZED HEALTH CARE EDUCATION/TRAINING PROGRAM

## 2022-11-18 PROCEDURE — 3600000014 HC SURGERY LEVEL 4 ADDTL 15MIN: Performed by: STUDENT IN AN ORGANIZED HEALTH CARE EDUCATION/TRAINING PROGRAM

## 2022-11-18 PROCEDURE — 88184 FLOWCYTOMETRY/ TC 1 MARKER: CPT

## 2022-11-18 PROCEDURE — 88305 TISSUE EXAM BY PATHOLOGIST: CPT

## 2022-11-18 PROCEDURE — 7100000010 HC PHASE II RECOVERY - FIRST 15 MIN: Performed by: STUDENT IN AN ORGANIZED HEALTH CARE EDUCATION/TRAINING PROGRAM

## 2022-11-18 PROCEDURE — 88185 FLOWCYTOMETRY/TC ADD-ON: CPT

## 2022-11-18 PROCEDURE — 3700000001 HC ADD 15 MINUTES (ANESTHESIA): Performed by: STUDENT IN AN ORGANIZED HEALTH CARE EDUCATION/TRAINING PROGRAM

## 2022-11-18 PROCEDURE — 31535 LARYNGOSCOPY W/BIOPSY: CPT | Performed by: STUDENT IN AN ORGANIZED HEALTH CARE EDUCATION/TRAINING PROGRAM

## 2022-11-18 PROCEDURE — 7100000001 HC PACU RECOVERY - ADDTL 15 MIN: Performed by: STUDENT IN AN ORGANIZED HEALTH CARE EDUCATION/TRAINING PROGRAM

## 2022-11-18 PROCEDURE — 6360000002 HC RX W HCPCS: Performed by: NURSE ANESTHETIST, CERTIFIED REGISTERED

## 2022-11-18 PROCEDURE — 2580000003 HC RX 258: Performed by: STUDENT IN AN ORGANIZED HEALTH CARE EDUCATION/TRAINING PROGRAM

## 2022-11-18 PROCEDURE — 3600000004 HC SURGERY LEVEL 4 BASE: Performed by: STUDENT IN AN ORGANIZED HEALTH CARE EDUCATION/TRAINING PROGRAM

## 2022-11-18 PROCEDURE — 43191 ESOPHAGOSCOPY RIGID TRNSO DX: CPT | Performed by: STUDENT IN AN ORGANIZED HEALTH CARE EDUCATION/TRAINING PROGRAM

## 2022-11-18 PROCEDURE — 88304 TISSUE EXAM BY PATHOLOGIST: CPT

## 2022-11-18 PROCEDURE — 2500000003 HC RX 250 WO HCPCS: Performed by: NURSE ANESTHETIST, CERTIFIED REGISTERED

## 2022-11-18 PROCEDURE — 7100000011 HC PHASE II RECOVERY - ADDTL 15 MIN: Performed by: STUDENT IN AN ORGANIZED HEALTH CARE EDUCATION/TRAINING PROGRAM

## 2022-11-18 PROCEDURE — 6370000000 HC RX 637 (ALT 250 FOR IP): Performed by: STUDENT IN AN ORGANIZED HEALTH CARE EDUCATION/TRAINING PROGRAM

## 2022-11-18 PROCEDURE — 7100000000 HC PACU RECOVERY - FIRST 15 MIN: Performed by: STUDENT IN AN ORGANIZED HEALTH CARE EDUCATION/TRAINING PROGRAM

## 2022-11-18 RX ORDER — METOPROLOL TARTRATE 5 MG/5ML
INJECTION INTRAVENOUS PRN
Status: DISCONTINUED | OUTPATIENT
Start: 2022-11-18 | End: 2022-11-18 | Stop reason: SDUPTHER

## 2022-11-18 RX ORDER — LIDOCAINE HYDROCHLORIDE 10 MG/ML
1 INJECTION, SOLUTION EPIDURAL; INFILTRATION; INTRACAUDAL; PERINEURAL
Status: DISCONTINUED | OUTPATIENT
Start: 2022-11-18 | End: 2022-11-18 | Stop reason: HOSPADM

## 2022-11-18 RX ORDER — IPRATROPIUM BROMIDE AND ALBUTEROL SULFATE 2.5; .5 MG/3ML; MG/3ML
1 SOLUTION RESPIRATORY (INHALATION) ONCE
Status: DISCONTINUED | OUTPATIENT
Start: 2022-11-18 | End: 2022-11-18 | Stop reason: HOSPADM

## 2022-11-18 RX ORDER — EPINEPHRINE 1 MG/ML
INJECTION, SOLUTION, CONCENTRATE INTRAVENOUS
Status: COMPLETED | OUTPATIENT
Start: 2022-11-18 | End: 2022-11-18

## 2022-11-18 RX ORDER — SUCCINYLCHOLINE CHLORIDE 20 MG/ML
INJECTION INTRAMUSCULAR; INTRAVENOUS PRN
Status: DISCONTINUED | OUTPATIENT
Start: 2022-11-18 | End: 2022-11-18 | Stop reason: SDUPTHER

## 2022-11-18 RX ORDER — HYDROMORPHONE HCL 110MG/55ML
0.5 PATIENT CONTROLLED ANALGESIA SYRINGE INTRAVENOUS EVERY 5 MIN PRN
Status: DISCONTINUED | OUTPATIENT
Start: 2022-11-18 | End: 2022-11-18 | Stop reason: HOSPADM

## 2022-11-18 RX ORDER — OXYCODONE HYDROCHLORIDE 5 MG/1
5-10 TABLET ORAL EVERY 4 HOURS PRN
Qty: 40 TABLET | Refills: 0 | Status: SHIPPED | OUTPATIENT
Start: 2022-11-18 | End: 2022-11-23

## 2022-11-18 RX ORDER — ONDANSETRON 4 MG/1
4 TABLET, FILM COATED ORAL 3 TIMES DAILY PRN
Qty: 15 TABLET | Refills: 0 | Status: SHIPPED | OUTPATIENT
Start: 2022-11-18 | End: 2022-11-18 | Stop reason: HOSPADM

## 2022-11-18 RX ORDER — DEXAMETHASONE SODIUM PHOSPHATE 4 MG/ML
INJECTION, SOLUTION INTRA-ARTICULAR; INTRALESIONAL; INTRAMUSCULAR; INTRAVENOUS; SOFT TISSUE PRN
Status: DISCONTINUED | OUTPATIENT
Start: 2022-11-18 | End: 2022-11-18 | Stop reason: SDUPTHER

## 2022-11-18 RX ORDER — MORPHINE SULFATE 10 MG/ML
INJECTION, SOLUTION INTRAMUSCULAR; INTRAVENOUS PRN
Status: DISCONTINUED | OUTPATIENT
Start: 2022-11-18 | End: 2022-11-18 | Stop reason: SDUPTHER

## 2022-11-18 RX ORDER — MAGNESIUM SULFATE HEPTAHYDRATE 500 MG/ML
INJECTION, SOLUTION INTRAMUSCULAR; INTRAVENOUS PRN
Status: DISCONTINUED | OUTPATIENT
Start: 2022-11-18 | End: 2022-11-18 | Stop reason: SDUPTHER

## 2022-11-18 RX ORDER — PROPOFOL 10 MG/ML
INJECTION, EMULSION INTRAVENOUS PRN
Status: DISCONTINUED | OUTPATIENT
Start: 2022-11-18 | End: 2022-11-18 | Stop reason: SDUPTHER

## 2022-11-18 RX ORDER — MIDAZOLAM HYDROCHLORIDE 1 MG/ML
INJECTION INTRAMUSCULAR; INTRAVENOUS PRN
Status: DISCONTINUED | OUTPATIENT
Start: 2022-11-18 | End: 2022-11-18 | Stop reason: SDUPTHER

## 2022-11-18 RX ORDER — HYDRALAZINE HYDROCHLORIDE 20 MG/ML
10 INJECTION INTRAMUSCULAR; INTRAVENOUS
Status: DISCONTINUED | OUTPATIENT
Start: 2022-11-18 | End: 2022-11-18 | Stop reason: HOSPADM

## 2022-11-18 RX ORDER — ONDANSETRON HYDROCHLORIDE 8 MG/1
8 TABLET, FILM COATED ORAL EVERY 8 HOURS PRN
Qty: 10 TABLET | Refills: 0 | Status: SHIPPED | OUTPATIENT
Start: 2022-11-18

## 2022-11-18 RX ORDER — LABETALOL HYDROCHLORIDE 5 MG/ML
10 INJECTION, SOLUTION INTRAVENOUS
Status: DISCONTINUED | OUTPATIENT
Start: 2022-11-18 | End: 2022-11-18 | Stop reason: HOSPADM

## 2022-11-18 RX ORDER — FENTANYL CITRATE 50 UG/ML
25 INJECTION, SOLUTION INTRAMUSCULAR; INTRAVENOUS EVERY 5 MIN PRN
Status: DISCONTINUED | OUTPATIENT
Start: 2022-11-18 | End: 2022-11-18 | Stop reason: HOSPADM

## 2022-11-18 RX ORDER — LIDOCAINE HYDROCHLORIDE 20 MG/ML
INJECTION, SOLUTION EPIDURAL; INFILTRATION; INTRACAUDAL; PERINEURAL PRN
Status: DISCONTINUED | OUTPATIENT
Start: 2022-11-18 | End: 2022-11-18 | Stop reason: SDUPTHER

## 2022-11-18 RX ORDER — KETAMINE HCL IN NACL, ISO-OSM 100MG/10ML
SYRINGE (ML) INJECTION PRN
Status: DISCONTINUED | OUTPATIENT
Start: 2022-11-18 | End: 2022-11-18 | Stop reason: SDUPTHER

## 2022-11-18 RX ORDER — PROCHLORPERAZINE EDISYLATE 5 MG/ML
5 INJECTION INTRAMUSCULAR; INTRAVENOUS
Status: DISCONTINUED | OUTPATIENT
Start: 2022-11-18 | End: 2022-11-18 | Stop reason: HOSPADM

## 2022-11-18 RX ORDER — IPRATROPIUM BROMIDE AND ALBUTEROL SULFATE 2.5; .5 MG/3ML; MG/3ML
1 SOLUTION RESPIRATORY (INHALATION) ONCE
Status: COMPLETED | OUTPATIENT
Start: 2022-11-18 | End: 2022-11-18

## 2022-11-18 RX ORDER — SODIUM CHLORIDE 9 MG/ML
INJECTION, SOLUTION INTRAVENOUS CONTINUOUS
Status: DISCONTINUED | OUTPATIENT
Start: 2022-11-18 | End: 2022-11-18 | Stop reason: HOSPADM

## 2022-11-18 RX ORDER — ONDANSETRON 2 MG/ML
4 INJECTION INTRAMUSCULAR; INTRAVENOUS
Status: DISCONTINUED | OUTPATIENT
Start: 2022-11-18 | End: 2022-11-18 | Stop reason: HOSPADM

## 2022-11-18 RX ORDER — FENTANYL CITRATE 50 UG/ML
INJECTION, SOLUTION INTRAMUSCULAR; INTRAVENOUS PRN
Status: DISCONTINUED | OUTPATIENT
Start: 2022-11-18 | End: 2022-11-18 | Stop reason: SDUPTHER

## 2022-11-18 RX ORDER — MAGNESIUM HYDROXIDE 1200 MG/15ML
LIQUID ORAL CONTINUOUS PRN
Status: COMPLETED | OUTPATIENT
Start: 2022-11-18 | End: 2022-11-18

## 2022-11-18 RX ORDER — ONDANSETRON 2 MG/ML
INJECTION INTRAMUSCULAR; INTRAVENOUS PRN
Status: DISCONTINUED | OUTPATIENT
Start: 2022-11-18 | End: 2022-11-18 | Stop reason: SDUPTHER

## 2022-11-18 RX ORDER — ROCURONIUM BROMIDE 10 MG/ML
INJECTION, SOLUTION INTRAVENOUS PRN
Status: DISCONTINUED | OUTPATIENT
Start: 2022-11-18 | End: 2022-11-18 | Stop reason: SDUPTHER

## 2022-11-18 RX ORDER — NALOXONE HYDROCHLORIDE 0.4 MG/ML
INJECTION, SOLUTION INTRAMUSCULAR; INTRAVENOUS; SUBCUTANEOUS PRN
Status: DISCONTINUED | OUTPATIENT
Start: 2022-11-18 | End: 2022-11-18 | Stop reason: SDUPTHER

## 2022-11-18 RX ADMIN — ROCURONIUM BROMIDE 30 MG: 10 INJECTION, SOLUTION INTRAVENOUS at 13:12

## 2022-11-18 RX ADMIN — SUGAMMADEX 200 MG: 100 INJECTION, SOLUTION INTRAVENOUS at 14:05

## 2022-11-18 RX ADMIN — LIDOCAINE HYDROCHLORIDE 80 MG: 20 INJECTION, SOLUTION EPIDURAL; INFILTRATION; INTRACAUDAL; PERINEURAL at 13:05

## 2022-11-18 RX ADMIN — METOPROLOL TARTRATE 3 MG: 5 INJECTION, SOLUTION INTRAVENOUS at 13:28

## 2022-11-18 RX ADMIN — FENTANYL CITRATE 100 MCG: 50 INJECTION, SOLUTION INTRAMUSCULAR; INTRAVENOUS at 13:11

## 2022-11-18 RX ADMIN — SUCCINYLCHOLINE CHLORIDE 80 MG: 20 INJECTION, SOLUTION INTRAMUSCULAR; INTRAVENOUS at 13:06

## 2022-11-18 RX ADMIN — NALOXONE HYDROCHLORIDE 0.1 MG: 0.4 INJECTION, SOLUTION INTRAMUSCULAR; INTRAVENOUS; SUBCUTANEOUS at 14:16

## 2022-11-18 RX ADMIN — SODIUM CHLORIDE: 9 INJECTION, SOLUTION INTRAVENOUS at 13:34

## 2022-11-18 RX ADMIN — MIDAZOLAM 1 MG: 1 INJECTION INTRAMUSCULAR; INTRAVENOUS at 12:59

## 2022-11-18 RX ADMIN — ONDANSETRON 4 MG: 2 INJECTION INTRAMUSCULAR; INTRAVENOUS at 13:12

## 2022-11-18 RX ADMIN — Medication 50 MG: at 13:11

## 2022-11-18 RX ADMIN — SODIUM CHLORIDE: 9 INJECTION, SOLUTION INTRAVENOUS at 12:10

## 2022-11-18 RX ADMIN — IPRATROPIUM BROMIDE AND ALBUTEROL SULFATE 1 AMPULE: 2.5; .5 SOLUTION RESPIRATORY (INHALATION) at 12:25

## 2022-11-18 RX ADMIN — METOPROLOL TARTRATE 2 MG: 5 INJECTION, SOLUTION INTRAVENOUS at 13:33

## 2022-11-18 RX ADMIN — DEXAMETHASONE SODIUM PHOSPHATE 8 MG: 4 INJECTION, SOLUTION INTRAMUSCULAR; INTRAVENOUS at 13:12

## 2022-11-18 RX ADMIN — MAGNESIUM SULFATE HEPTAHYDRATE 1 G: 500 INJECTION, SOLUTION INTRAMUSCULAR; INTRAVENOUS at 13:13

## 2022-11-18 RX ADMIN — MIDAZOLAM 1 MG: 1 INJECTION INTRAMUSCULAR; INTRAVENOUS at 13:01

## 2022-11-18 RX ADMIN — ROCURONIUM BROMIDE 10 MG: 10 INJECTION, SOLUTION INTRAVENOUS at 13:05

## 2022-11-18 RX ADMIN — MORPHINE SULFATE 10 MG: 10 INJECTION, SOLUTION INTRAMUSCULAR; INTRAVENOUS at 13:18

## 2022-11-18 RX ADMIN — PROPOFOL 200 MG: 10 INJECTION, EMULSION INTRAVENOUS at 13:05

## 2022-11-18 ASSESSMENT — LIFESTYLE VARIABLES: SMOKING_STATUS: 1

## 2022-11-18 ASSESSMENT — PAIN DESCRIPTION - DESCRIPTORS: DESCRIPTORS: DISCOMFORT

## 2022-11-18 ASSESSMENT — PAIN DESCRIPTION - PAIN TYPE: TYPE: ACUTE PAIN

## 2022-11-18 ASSESSMENT — PAIN SCALES - GENERAL: PAINLEVEL_OUTOF10: 2

## 2022-11-18 ASSESSMENT — PAIN DESCRIPTION - FREQUENCY: FREQUENCY: CONTINUOUS

## 2022-11-18 ASSESSMENT — PAIN DESCRIPTION - LOCATION: LOCATION: THROAT

## 2022-11-18 ASSESSMENT — ENCOUNTER SYMPTOMS: SHORTNESS OF BREATH: 1

## 2022-11-18 NOTE — DISCHARGE INSTRUCTIONS
[facility] ENT  Dr. Vashti Olivera, DO    Post-op Laryngoscopy and Tonsillectomy    GENERAL  1. Stay well hydrated and be sure to drink plenty of liquids. Popsicles, ice creams, and cold drinks can help sooth the back of the throat. Stay away from drinks with red dye, as this can mimic the look of blood. 2. Soft diet day of surgery and you can start to progress to more solid foods as tolerated tomorrow. Avoid sharp foods such as tortilla chips. 3. No strenuous physical activity x 2 weeks. 4. Take opoid pain medication (Roxicodone) only as needed for Severe pain as directed. For mild and moderate pain you can alternate over-the-counter Tylenol and Motrin (can use the liquid prepreations if needed) for pain every 4 to 6 hours as directed by instructions printed on the medication bottle. You may also apply an ice pack to the throat as needed for pain. 5. You are encouraged to use a humidifier in the bedroom at home to keep the throat moist.   6. Go to your closest emergency room if you experience bright red bleeding from the mouth / back of the throat. 7. Return to the office as scheduled for your post-operative follow-up appointment. 8. Please Contact Dr. Oleg Leon office with any questions or concerns. DIET   It is very important to drink plenty of fluids. Dehydration and not swallowing increase the pain and prolong the healing process. Soft foods are preferable at first.  Avoid hot, spicy or acidic foods. Carbonated beverages tend to be uncomfortable. FEVER   Low grade fever up to 101 is normal within the first 48 hours. Fever greater than 101 should be addressed by calling the number above    BLEEDING   Small amount of blood tinged secretions in your saliva is common during the first 24 hours. If you notice any significant bleeding (More than a tablespoon of obvious blood) go to Emergency Room Immediately. ACTIVITY   1 week off school/work is required following surgery.   NO strenuous activity, sports, physical education or heavy lifting for 2 weeks. ADDITIONAL INFO   Sleep for the first few nights with your head elevated, this will help with swelling. Cool vapor bedside humidification is advisable if available. Ice packs to the neck as needed.     CALL:  With any questions or concerns regarding your post-operative course

## 2022-11-18 NOTE — OP NOTE
OPERATIVE NOTE:    Patient Name: Lorie Bernabe  YOB: 1960  Medical Record Number:  [id number]  Billing Number:  [id number]  Date of Procedure: 11/18/2022  Time: 1300    Pre Operative Diagnoses:    1. Lesion of tonsil  2. Laryngeal squamous cell carcinoma  3. Abnormal positron emission tomography (PET) scan    Post Operative Diagnoses:  Same           Procedure:    1. Direct laryngoscopy with biopsy of the base of the tongue  2. Rigid esophagoscopy, diagnostic  3. Bilateral tonsillectomy           Surgeon: Tierra Reis DO    Anesthesia:  General anesthesia     Findings:    1) The exposure was grade 1   2) Bilateral tonsils 2+, palpable firmness within the left superior palatine tonsil. Along the left anterior tonsillar pillar there was edema and erythema, markedly different in appearance in the contralateral tonsil. 3) Base of tongue and lingual tonsils appears symmetric. Left base of tongue biopsies were taken. 4) The supraglottis appeared within normal limit, there were no lesions evident on the vallecula, epiglottis, arytenoids and false vocal cords  5) The pyriform sinuses and postcricoid area appeared within normal limits  6) left true vocal fold with white neoplastic changes. Nonobstructive. Right true vocal fold without exophytic lesion. 7) Esophagoscopy demonstrated no exophytic neoplastic or ulcerative lesions. Mucosa appears within normal limits. Description of Procedure:   After verification of informed consent, the patient was brought to the operating room and placed in the supine position. General anesthesia was induced. Timeout was performed agreed upon by the entirety the operating room staff. The patient was then prepped and draped in the clean and usual fashion. Digital palpation of the bilateral tonsils and the base of tongue was performed which demonstrated some firmness in the superior aspect of the left tonsil. No palpable base of tongue masses were palpated. The Pillings laryngoscope with silicone upper dental guard was used to expose the larynx the supraglottis and glottis as described. A thorough evaluation of the oral cavity, vallecula, base of tongue, bilateral tonsils, true and false vocal cords and bilateral pyriform sinuses was performed. At the left base of the tongue, multiple random biopsies were taken with cup forceps and sent for routine pathology. Bleeding was controlled with epinephrine soaked cottonoid pledgets. After this rigid esophagoscopy was performed with findings noted above. The silicon dental guard was then removed. A Karolina Bobby oral retractor was placed in the oral cavity and placed on suspension. A straight Allis clamp was then used with Bovie electrocautery on 25 montero spray to grasp and resect the right tonsil from the superior down to the inferior pole. After this, the left tonsil was resected in a similar fashion from the superior to inferior pole. Both of these tonsils were sent separately denoting laterality for both histopathological permanent analysis as well as flow cytometry testing. The bilateral tonsillar fossa were then copiously irrigated again with normal saline which was simultaneously suction free from the oral cavity. After suctioning any sites or potential sites of bleeding were cauterized with suction Bovie at 25 montero spray to obtain hemostasis. The tonsillar fossae were packed with tonsil sponges and the Karolina-Bobby was taken off suspension, allowing for neck muscular relaxation for approximately 2 minutes. The Karolina-Bobby was then resuspended, tonsil sponges removed, and the bilateral tonsillar fossa were re-inspected and bleeding controlled as needed. The patient was then turned back to the care of Anesthesia to recover. The patient tolerated the procedure well and was transferred to the recovery room in stable condition.       Specimens:   ID Type Source Tests Collected by Time Destination   A :  Tissue Tissue SURGICAL PATHOLOGY Mane Donovan, DO 11/18/2022 1320    B :  Tissue Tissue SURGICAL PATHOLOGY, FLOW CYTOMETRY LEUKEMIA/LYMPHOMA NODES OR FLUIDS Mane Donovan, DO 11/18/2022 1341    C :  Tissue Tissue SURGICAL PATHOLOGY, FLOW CYTOMETRY LEUKEMIA/LYMPHOMA NODES OR FLUIDS Mane Donovan, DO 11/18/2022 1342      Estimated Blood Loss: 20    Complications:  None

## 2022-11-18 NOTE — BRIEF OP NOTE
Brief Postoperative Note      Patient: Lisa Guadarrama  YOB: 1960  MRN: [id number]    Date of Procedure: 11/18/2022    Pre-Op Diagnosis: Lesion of tonsil and base of tongue,  Abnormal gastrointestinal positron emission tomography (PET) scan [R94.8]    Post-Op Diagnosis: Same       Procedure(s):  TONSILLECTOMY, DIRECT LARYNGOSCOPY WITH BIOPSY, RIGID ESOPHAGOSCOPY    Surgeon(s):  Mane Donovan DO    Assistant:  * No surgical staff found *    Anesthesia: General    Estimated Blood Loss (mL): 20 ml    Complications: None    Specimens:   ID Type Source Tests Collected by Time Destination   A :  Tissue Tissue SURGICAL PATHOLOGY Mane Donovan DO 11/18/2022 1320    B :  Tissue Tissue SURGICAL PATHOLOGY, FLOW CYTOMETRY LEUKEMIA/LYMPHOMA NODES OR FLUIDS Mane Donovan DO 11/18/2022 1341    C :  Tissue Tissue SURGICAL PATHOLOGY, FLOW CYTOMETRY LEUKEMIA/LYMPHOMA NODES OR FLUIDS Mane Donovan DO 11/18/2022 1342        Implants:  * No implants in log *      Drains: * No LDAs found *    Findings: Tonsils 2+ bilaterally, left tonsil anterior pillar with fullness and erythema. Right tonsil appears WNL. No base of tongue abnormalities.      Electronically signed by Mane Donovan DO on 11/18/2022 at 2:10 PM

## 2022-11-18 NOTE — PROGRESS NOTES
Dr. Joseph Campos bedside to speak with pt. Pt more awake and alert at this time, tolerating ice chips.

## 2022-11-18 NOTE — ANESTHESIA PRE PROCEDURE
Department of Anesthesiology  Preprocedure Note       Name:  Mariely Prasad   Age:  57 y.o.  :  1960                                          MRN:  [id number]         Date:  2022      Surgeon: Melia Marmolejo):  Julian Carvajal DO    Procedure: Procedure(s):  TONSILLECTOMY  MICROSCOPIC DIRECT LARYNGOSCOPY WITH BIOPSY WITH FROZEN SECTIONS; RIGID ESOPHAGOSCOPY    Medications prior to admission:   Prior to Admission medications    Medication Sig Start Date End Date Taking? Authorizing Provider   SYMBICORT 160-4.5 MCG/ACT AERO 2 puffs 2 times daily Take your inhaler as directed the DOS and bring with you DOS. 22   Historical Provider, MD   tiotropium (SPIRIVA RESPIMAT) 2.5 MCG/ACT AERS inhaler Inhale into the lungs daily 3/8/22   Historical Provider, MD   albuterol sulfate  (90 Base) MCG/ACT inhaler Take your inhaler as directed the DOS and bring with you DOS. 3/29/21   Historical Provider, MD   fluticasone-salmeterol (ADVAIR) 250-50 MCG/DOSE AEPB Inhale 1 puff into the lungs every 12 hours 3/31/21   Charli Bishop MD       Current medications:    No current facility-administered medications for this visit. No current outpatient medications on file. Facility-Administered Medications Ordered in Other Visits   Medication Dose Route Frequency Provider Last Rate Last Admin    0.9 % sodium chloride infusion   IntraVENous Continuous Julian Carvajal DO           Allergies:     Allergies   Allergen Reactions    Codeine Shortness Of Breath and Nausea And Vomiting       Problem List:    Patient Active Problem List   Diagnosis Code    Strain of left levator scapulae muscle S46.812A    Shortness of breath R06.02    Other fatigue R53.83    Cigarette nicotine dependence with nicotine-induced disorder F17.219    Acute asthmatic bronchitis J45.909    Colonoscopy refused Z53.20       Past Medical History:        Diagnosis Date    Asthma     [facility]     [facility]     Laryngeal cancer ([facility]     PICC (peripherally inserted central catheter) in place     right arm       Past Surgical History:        Procedure Laterality Date    FINGER AMPUTATION  2016    right ring finger    LARYNGOSCOPY N/A 9/22/2022    MICRODIRECT LARYNGOSCOPY WITH BIOPSY FLEXIBLE ESOPHAGOSCOPY AND BRONCHOSCOPY performed by Nichol Bobo MD at [facility]        Social History:    Social History     Tobacco Use    Smoking status: Every Day     Packs/day: 1.00     Years: 40.00     Pack years: 40.00     Types: Cigarettes    Smokeless tobacco: Never    Tobacco comments:     Trying to quit down to 1/2 ppd   Substance Use Topics    Alcohol use: Yes     Comment: 2-3 beer a day                                Ready to quit: Not Answered  Counseling given: Not Answered  Tobacco comments: Trying to quit down to 1/2 ppd      Vital Signs (Current): There were no vitals filed for this visit. BP Readings from Last 3 Encounters:   11/08/22 (!) 139/96   09/26/22 (!) 145/93   09/22/22 (!) 140/85       NPO Status:                            >8hrs                                                       BMI:   Wt Readings from Last 3 Encounters:   11/15/22 135 lb (61.2 kg)   11/08/22 131 lb (59.4 kg)   09/22/22 134 lb (60.8 kg)     There is no height or weight on file to calculate BMI.    CBC: No results found for: WBC, RBC, HGB, HCT, MCV, RDW, PLT    CMP:   Lab Results   Component Value Date/Time    CREATININE 0.6 09/12/2022 03:17 PM    GFRAA >60 09/12/2022 03:17 PM    LABGLOM >60 09/12/2022 03:17 PM       POC Tests: No results for input(s): POCGLU, POCNA, POCK, POCCL, POCBUN, POCHEMO, POCHCT in the last 72 hours.     Coags: No results found for: PROTIME, INR, APTT    HCG (If Applicable): No results found for: PREGTESTUR, PREGSERUM, HCG, HCGQUANT     ABGs: No results found for: PHART, PO2ART, GTJ3AYN, EJO5TVC, BEART, K0PKBWSL     Type & Screen (If Applicable):  No results found for: LABABO, LABRH    Drug/Infectious Status (If Applicable):  No results found for: HIV, HEPCAB    COVID-19 Screening (If Applicable): No results found for: COVID19        Anesthesia Evaluation  Patient summary reviewed no history of anesthetic complications:   Airway: Mallampati: II  TM distance: >3 FB   Neck ROM: full  Mouth opening: > = 3 FB   Dental:      Comment: No loose teeth    Pulmonary:   (+) COPD:  shortness of breath:  wheezes current smoker          Patient smoked on day of surgery. ROS comment: dysphonia  PE comment: States has wheezing at baseline  Cardiovascular:        (-) hypertension, valvular problems/murmurs, past MI,  angina and  CHF      Rhythm: regular  Rate: normal                    Neuro/Psych:   (+) neuromuscular disease:, psychiatric history:            GI/Hepatic/Renal:        (-) GERD, PUD and liver disease       Endo/Other:    (+) malignancy/cancer. (-) diabetes mellitus, hypothyroidism               Abdominal:             Vascular: Other Findings:             Anesthesia Plan      general     ASA 3       Induction: intravenous. MIPS: Postoperative opioids intended and Prophylactic antiemetics administered. Anesthetic plan and risks discussed with patient. Use of blood products discussed with patient whom. Plan discussed with CRNA.                 Ramón Aguayo MD   11/18/2022

## 2022-11-18 NOTE — ANESTHESIA POSTPROCEDURE EVALUATION
Department of Anesthesiology  Postprocedure Note    Patient: Rosey Bowen  MRN: [id number]  YOB: 1960  Date of evaluation: 11/18/2022      Procedure Summary     Date: 11/18/22 Room / Location: [address]    Anesthesia Start: 1259 Anesthesia Stop: 1430    Procedures:       TONSILLECTOMY (Mouth)      MICROSCOPIC DIRECT LARYNGOSCOPY WITH BIOPSY, RIGID ESOPHAGOSCOPY (Throat) Diagnosis:       Lesion of tonsil      Abnormal gastrointestinal positron emission tomography (PET) scan      (Lesion of tonsil [J35.9])      (Abnormal gastrointestinal positron emission tomography (PET) scan [R94.8])    Surgeons: Ryan Celis DO Responsible Provider: Smita Noel MD    Anesthesia Type: general ASA Status: 3          Anesthesia Type: No value filed.     Elsy Phase I: Elsy Score: 10    Elsy Phase II:        Anesthesia Post Evaluation    Patient location during evaluation: PACU  Patient participation: complete - patient participated  Level of consciousness: awake and alert  Airway patency: patent  Nausea & Vomiting: no nausea and no vomiting  Complications: no  Cardiovascular status: hemodynamically stable  Respiratory status: acceptable  Hydration status: stable  Multimodal analgesia pain management approach

## 2022-11-18 NOTE — PROGRESS NOTES
Discharge instructions review with patient and pt girlfriend. All home medications have been reviewed, pt v/u. Pt discharged via wheelchair. Pt discharged with instructions, prescriptions and all belongings. Girlfriend taking stable pt home.

## 2022-11-18 NOTE — PROGRESS NOTES
Pt arrived from OR to PACU bay 7. Report received from OR staff. Pt awake, unable to follow commands, appears confused, post anesthesia. Pt arrives with simple mask in place, infusing 4L oxygen, vitals as charted.

## 2022-11-21 DIAGNOSIS — G89.18 POST-TONSILLECTOMY PAIN: Primary | ICD-10-CM

## 2022-11-21 DIAGNOSIS — C32.9 LARYNGEAL SQUAMOUS CELL CARCINOMA (HCC): ICD-10-CM

## 2022-11-21 DIAGNOSIS — Z90.89 POST-TONSILLECTOMY PAIN: Primary | ICD-10-CM

## 2022-11-21 RX ORDER — HYDROCODONE BITARTRATE AND ACETAMINOPHEN 5; 325 MG/1; MG/1
1 TABLET ORAL EVERY 6 HOURS PRN
Qty: 20 TABLET | Refills: 0 | Status: SHIPPED | OUTPATIENT
Start: 2022-11-21 | End: 2022-11-26

## 2022-11-21 NOTE — PROGRESS NOTES
Spoke to patient's care provider. Patient unable to tolerate PO oxy due to persistent nausea and emesis. We will prescribe the patient Norco to address his post tonsillectomy pain. R/B/A discussed.

## 2022-11-22 ENCOUNTER — OFFICE VISIT (OUTPATIENT)
Dept: ENT CLINIC | Age: 62
End: 2022-11-22

## 2022-11-22 VITALS — SYSTOLIC BLOOD PRESSURE: 131 MMHG | HEART RATE: 87 BPM | TEMPERATURE: 98.4 F | DIASTOLIC BLOOD PRESSURE: 89 MMHG

## 2022-11-22 DIAGNOSIS — C32.9 LARYNGEAL SQUAMOUS CELL CARCINOMA (HCC): Primary | ICD-10-CM

## 2022-11-22 DIAGNOSIS — J35.9 LESION OF TONSIL: ICD-10-CM

## 2022-11-22 DIAGNOSIS — Z45.2 PICC (PERIPHERALLY INSERTED CENTRAL CATHETER) IN PLACE: ICD-10-CM

## 2022-11-22 PROCEDURE — 99024 POSTOP FOLLOW-UP VISIT: CPT | Performed by: STUDENT IN AN ORGANIZED HEALTH CARE EDUCATION/TRAINING PROGRAM

## 2022-11-22 NOTE — PROGRESS NOTES
[address] [place] [address]- VISIT      Patient Name: Ayana Mistry  Medical Record Number:  [id number]  Primary Care Physician:  Wilma Thomas MD    ChiefComplaint     Post-op visit    History of Present Illness     Ayana Mistry is an 60 y.o. male s/p tonsillectomy, direct laryngoscopy with biopsy of the base of the tongue, rigid esophagoscopy,. Doing well, no surgical issues. Pain controlled with Norco, still utilizing Norco for pain. PICC placed 1 month ago.      Past Medical History     Past Medical History:   Diagnosis Date    Asthma     Cancer ([facility]     COPD (chronic obstructive pulmonary disease) ([facility]     Laryngeal cancer ([facility]     PICC (peripherally inserted central catheter) in place     right arm       Past Surgical History     Past Surgical History:   Procedure Laterality Date    FINGER AMPUTATION  2016    right ring finger    LARYNGOSCOPY N/A 9/22/2022    MICRODIRECT LARYNGOSCOPY WITH BIOPSY FLEXIBLE ESOPHAGOSCOPY AND BRONCHOSCOPY performed by Dwight Chamberlain MD at [address] [place] [address] 305 11/18/2022    TONSILLECTOMY performed by Bobby Merino DO at [facility] 11/18/2022    MICROSCOPIC DIRECT LARYNGOSCOPY WITH BIOPSY, RIGID ESOPHAGOSCOPY performed by Bobby Merino DO at [address] [place] [address] Right        Family History     Family History   Problem Relation Age of Onset    Esophageal Cancer Mother        Social History     Social History     Tobacco Use    Smoking status: Every Day     Packs/day: 1.00     Years: 40.00     Pack years: 40.00     Types: Cigarettes    Smokeless tobacco: Never    Tobacco comments:     Trying to quit down to 1/2 ppd   Vaping Use    Vaping Use: Never used   Substance Use Topics    Alcohol use: Yes     Comment: 2-3 beer a day    Drug use: Never        Allergies     Allergies   Allergen Reactions    Codeine Shortness Of Breath and Nausea And Vomiting Medications     Current Outpatient Medications   Medication Sig Dispense Refill    Nebulizer MISC by Does not apply route      ondansetron (ZOFRAN) 8 MG tablet Take 1 tablet by mouth every 8 hours as needed for Nausea or Vomiting 10 tablet 0    SYMBICORT 160-4.5 MCG/ACT AERO 2 puffs 2 times daily Take your inhaler as directed the DOS and bring with you DOS. tiotropium (SPIRIVA RESPIMAT) 2.5 MCG/ACT AERS inhaler Inhale into the lungs daily      albuterol sulfate  (90 Base) MCG/ACT inhaler Take your inhaler as directed the DOS and bring with you DOS. fluticasone-salmeterol (ADVAIR) 250-50 MCG/DOSE AEPB Inhale 1 puff into the lungs every 12 hours 60 each 0     No current facility-administered medications for this visit. Review of Systems     REVIEW OF SYSTEMS  The following systems were reviewed and revealed the following in addition to any already discussed in the HPI:    CONSTITUTIONAL: no weight loss, no fever, no night sweats, no chills    PhysicalExam     Vitals:    11/22/22 1509   BP: 131/89   Pulse: 87   Temp: 98.4 °F (36.9 °C)   TempSrc: Temporal       PHYSICAL EXAM  /89   Pulse 87   Temp 98.4 °F (36.9 °C) (Temporal)     GENERAL: No acute distress, alert and oriented  ENT: Bilateral tonsillar fossa with granulation tissue. No evidence of blood clot or bleeding. Right upper extremity: PICC in place loosely held in with small amount of Tegaderm that is not adherent to the skin. Surrounding skin around PICC dirty. I have performed a head and neck physical exam personally or was physically present during the key or critical portions of the service.     Pathology     [address], [address] [facility]                                        Fax [phone]   [phone]   Department of Pathology   FINAL SURGICAL PATHOLOGY REPORT   Patient Name:  Casey Carvajal Accession No:  [id number]    Age Sex:   1960    57 Y / M       Location:      [facility]   Account No:    [de-identified]                  Collected:     2022   Med Rec No:    [id number]                 Received:      2022   Attend Phys:   Sherry Gao DO          Completed:     2022   Perform Phys:  Sherry Gao DO             FINAL DIAGNOSIS:     A. Left base of tongue, biopsies:   - Benign squamous mucosa and lymphoid tissue.   - Negative for dysplasia or malignancy. B. Left tonsil, tonsillectomy:   - Benign lymphoid follicular hyperplasia. - Actinomyces identified.   - Negative for malignancy. C.  Right tonsil, tonsillectomy:   - Benign lymphoid follicular hyperplasia. - Actinomyces identified.   - Negative for malignancy. COMMENT:   The corresponding flow cytometric analysis of tonsil   (SW-) shows no phenotypically abnormal cell population.    [place] [address]                                                      [address] [facility] [address], [place] [address]                                                      Fax [phone]                                                      [facility] [phone]   Department of Pathology   FINAL FLOW CYTOMETRY REPORT   Patient       Yanet Chandler               Accession     [id number]   Name:                                       No:    Age Sex:  1960    57 Y / M        Location:     [facility]   Account No:   [de-identified]                   Collected:    2022   Med Rec No:   [id number]                  Received:     2022   Attend Phys:  Sherry Gao DO           Completed:    2022   Request       SLOAN PATINO DO   Phys:       INTERPRETATION:     Left Tonsil: No phenotypically abnormal cell population identified. Suggest correlation of phenotype with clinical morphologic and other   pertinent laboratory findings, as well as other ancillary studies, as   appropriate. CLINICAL INFORMATION:   Clinical Diagnosis: Tonsillar lesion   Specimen: Left tonsil   Cell Count: 2.0 x 107   Viability: 93%     DATA ANALYSIS: :   Lymphocytes   B-cells are polyclonal and T-cells are heterogeneous with no aberrant   antigen loss or expression. CD4/CD8 ratio is 5.09.     ANTIBODIES USED/RESULTS:   FMC7 [*], CD10 [*], CD19 [*], CD20 [*], CD22 [*], CD23 [*], Kappa light   chain [*], Lambda light chain [*], CD2 [*], CD3 [*], CD4 [*], CD5 [*],   CD7 [*], CD8 [*], CD16 [*], CD56 [*],  [*], CD30 [*], CD38 [*],   HLA-DR [*]     * = No abnormal population identified     Note: These tests were developed and their performance characteristics   determined by [facility]. They have not been cleared or   approved by the U.S. Food and Drug Administration. The FDA has   determined that such clearance or approval is not necessary. Case signed out at [facility],   [address] [facility] [address]   Technical processing at [facility], [address] [facility] [address]  Phone [phone]   CPT: Rodney Umanzor M.D., PH.D.   (Electronic Signature)   11/20/2022     Assessment and Plan     1. Laryngeal squamous cell carcinoma ([facility]  2. Lesion of tonsil  -Tonsil and base of tongue with normal pathology. Recommend follow back up with heme-onc and radiation oncology for definitive management of laryngeal squamous cell carcinoma. Follow-up with Dr. Jermaine Mccarty for ongoing surveillance of disease. 3. PICC (peripherally inserted central catheter) in place  -Need new PICC placement. Order placed. Follow-Up     Return with Dr. Jermaine Mccarty.       Dr. Ellie Hawley [place] [address]  Department of Otolaryngology/Head and Neck Surgery  12/5/22    Medical Decision Making: The following items were considered in medical decision making:  Independent review of images  Review / order clinical lab tests  Review / order radiology tests  Decision to obtain old records    Portions of this note were dictated using Dragon.  There may be linguistic errors secondary to the use of this program.

## 2022-11-25 ENCOUNTER — TELEPHONE (OUTPATIENT)
Dept: ENT CLINIC | Age: 62
End: 2022-11-25

## 2022-11-25 DIAGNOSIS — Z90.89 POST-TONSILLECTOMY PAIN: Primary | ICD-10-CM

## 2022-11-25 DIAGNOSIS — G89.18 POST-TONSILLECTOMY PAIN: Primary | ICD-10-CM

## 2022-11-25 NOTE — TELEPHONE ENCOUNTER
Refill Request:     Last Office Visit:  11/22/2022     Next Scheduled Visit : Visit date not found     Medication Requested:Hydrocodone-acetaminophen 5-(325 MG)    Pharmacy:    [facility] [id number] - [address] [place] [address], [address] [place] [address]  Phone: [phone] Fax: [phone]

## 2022-11-26 ENCOUNTER — TELEPHONE (OUTPATIENT)
Dept: OTOLARYNGOLOGY | Age: 62
End: 2022-11-26

## 2022-11-26 DIAGNOSIS — G89.18 ACUTE POST-OPERATIVE PAIN: Primary | ICD-10-CM

## 2022-11-26 RX ORDER — HYDROCODONE BITARTRATE AND ACETAMINOPHEN 5; 325 MG/1; MG/1
1 TABLET ORAL EVERY 4 HOURS PRN
Qty: 18 TABLET | Refills: 0 | Status: SHIPPED | OUTPATIENT
Start: 2022-11-26 | End: 2022-11-29

## 2022-11-26 RX ORDER — HYDROCODONE BITARTRATE AND ACETAMINOPHEN 5; 325 MG/1; MG/1
1 TABLET ORAL EVERY 4 HOURS PRN
Qty: 18 TABLET | Refills: 0 | Status: SHIPPED | OUTPATIENT
Start: 2022-11-26 | End: 2022-11-26 | Stop reason: CLARIF

## 2022-11-26 RX ORDER — HYDROCODONE BITARTRATE AND ACETAMINOPHEN 5; 325 MG/1; MG/1
1 TABLET ORAL EVERY 4 HOURS PRN
Qty: 18 TABLET | Refills: 0 | Status: SHIPPED
Start: 2022-11-26 | End: 2022-11-26 | Stop reason: RX

## 2022-11-26 NOTE — TELEPHONE ENCOUNTER
Spoke with patient's friend, [facility], about an hour ago as she contacted me through on call service. States that Erich  has a lot of post-op throat pain, has run out of [place]. Needs refill. I refilled Rx electronically at that time. Received another call from [facility] the pharmacy did not have the Rx. I called pharmacy and spoke with tech who stated that the Rx did not go through. Will resend Rx for norco.     Also relayed benign path results from tonsils and BOT. States they spoke with Dr. Cristine Frias on Wednesday, set to start RT soon.

## 2023-08-07 ENCOUNTER — HOSPITAL ENCOUNTER (OUTPATIENT)
Dept: PET IMAGING | Age: 63
Discharge: HOME | End: 2023-08-07
Payer: COMMERCIAL

## 2023-08-07 DIAGNOSIS — C14.0 THROAT CANCER (HCC): ICD-10-CM

## 2023-08-07 PROCEDURE — A9552 F18 FDG: HCPCS | Performed by: INTERNAL MEDICINE

## 2023-08-07 PROCEDURE — 3430000000 HC RX DIAGNOSTIC RADIOPHARMACEUTICAL: Performed by: INTERNAL MEDICINE

## 2023-08-07 PROCEDURE — 78815 PET IMAGE W/CT SKULL-THIGH: CPT

## 2023-08-07 RX ORDER — FLUDEOXYGLUCOSE F 18 200 MCI/ML
13.71 INJECTION, SOLUTION INTRAVENOUS
Status: COMPLETED | OUTPATIENT
Start: 2023-08-07 | End: 2023-08-07

## 2023-08-07 RX ADMIN — FLUDEOXYGLUCOSE F 18 13.71 MILLICURIE: 200 INJECTION, SOLUTION INTRAVENOUS at 08:07

## 2023-10-23 ENCOUNTER — OFFICE VISIT (OUTPATIENT)
Dept: ENT CLINIC | Age: 63
End: 2023-10-23
Payer: COMMERCIAL

## 2023-10-23 VITALS
WEIGHT: 116 LBS | RESPIRATION RATE: 16 BRPM | SYSTOLIC BLOOD PRESSURE: 154 MMHG | BODY MASS INDEX: 17.58 KG/M2 | HEIGHT: 68 IN | OXYGEN SATURATION: 97 % | DIASTOLIC BLOOD PRESSURE: 101 MMHG | HEART RATE: 66 BPM

## 2023-10-23 DIAGNOSIS — R94.8 ABNORMAL POSITRON EMISSION TOMOGRAPHY (PET) SCAN: ICD-10-CM

## 2023-10-23 DIAGNOSIS — C32.9 LARYNGEAL SQUAMOUS CELL CARCINOMA (HCC): Primary | ICD-10-CM

## 2023-10-23 PROCEDURE — G8427 DOCREV CUR MEDS BY ELIG CLIN: HCPCS | Performed by: STUDENT IN AN ORGANIZED HEALTH CARE EDUCATION/TRAINING PROGRAM

## 2023-10-23 PROCEDURE — 3017F COLORECTAL CA SCREEN DOC REV: CPT | Performed by: STUDENT IN AN ORGANIZED HEALTH CARE EDUCATION/TRAINING PROGRAM

## 2023-10-23 PROCEDURE — 1036F TOBACCO NON-USER: CPT | Performed by: STUDENT IN AN ORGANIZED HEALTH CARE EDUCATION/TRAINING PROGRAM

## 2023-10-23 PROCEDURE — G8484 FLU IMMUNIZE NO ADMIN: HCPCS | Performed by: STUDENT IN AN ORGANIZED HEALTH CARE EDUCATION/TRAINING PROGRAM

## 2023-10-23 PROCEDURE — G8419 CALC BMI OUT NRM PARAM NOF/U: HCPCS | Performed by: STUDENT IN AN ORGANIZED HEALTH CARE EDUCATION/TRAINING PROGRAM

## 2023-10-23 PROCEDURE — 31575 DIAGNOSTIC LARYNGOSCOPY: CPT | Performed by: STUDENT IN AN ORGANIZED HEALTH CARE EDUCATION/TRAINING PROGRAM

## 2023-10-23 PROCEDURE — 99214 OFFICE O/P EST MOD 30 MIN: CPT | Performed by: STUDENT IN AN ORGANIZED HEALTH CARE EDUCATION/TRAINING PROGRAM

## 2023-10-23 RX ORDER — OXYCODONE HYDROCHLORIDE 10 MG/1
10 TABLET ORAL EVERY 4 HOURS PRN
COMMUNITY
Start: 2023-01-19

## 2023-10-23 NOTE — PROGRESS NOTES
[address] (:  1960) is a 60 y.o. male, here for evaluation of the following chief complaint(s):  Follow-up      ASSESSMENT/PLAN:  1. Laryngeal squamous cell carcinoma ([address] [facility])  2. Abnormal positron emission tomography (PET) scan      This is a very pleasant 60 y.o. male here today for evaluation of the the above-noted complaints.      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 -Independently reviewed and interpreted his nose treatment PET scan from 2023. There is persistent uptake along the left true cord and along the arytenoids.  -[place] flexible laryngoscopy today there is some atypical changes which do not simply appear to be postradiation changes to the larynx. I discussed with him that the next step would be to obtain a tissue sample to rule out persistent cancer of the larynx. We discussed indications for surgery in detail. We discussed the risk benefits alternatives including but not limited to bleeding, infection, airway obstruction, worsening of his voice, injury to the teeth lips or gums, and other rare complications including airway fire. The patient expressed understanding and wishes to proceed. -Risks and benefits of direct laryngoscopy and biopsy under general anesthesia include injury to the oral, oropharyngeal or laryngeal structures (teeth, lips, tongue, mucous membranes), hemorrhage, dysphagia/odynophagia, voice changes, need for further biopsies outlined. Patient in agreement with plan for procedure          Medical Decision Making:   The following items were considered in medical decision making:  Independent review of images  Review / order clinical lab tests  Review / order radiology tests  Decision to obtain old records  Review and summation of old records as accessed through [facility] if

## 2023-11-13 ENCOUNTER — TELEPHONE (OUTPATIENT)
Dept: ENT CLINIC | Age: 63
End: 2023-11-13

## 2023-11-13 NOTE — TELEPHONE ENCOUNTER
Pt calling to reschedule surgery; he says his phone doesn't always ring, so he is asking that we call his girlfriend Claudine Kenny to schedule the surgery. He states he had to cancel his surgery (laryngoscopy w/biopsy) because his truck broke down but he does want to get it rescheduled.

## 2023-11-30 ENCOUNTER — TELEPHONE (OUTPATIENT)
Dept: ENT CLINIC | Age: 63
End: 2023-11-30

## 2023-11-30 NOTE — TELEPHONE ENCOUNTER
Pt called to confirm his surgery on 12/7/23 with Dr Stefania Pérez, asking if there is anything else he needs to know? Please call to discuss with patient.

## 2024-01-03 NOTE — PROGRESS NOTES
Name_______________________________________Printed:____________________  Date and time of surgery___1/8/24  1300_____________________Arrival Time:___1130  [facility]_   1. The instructions given regarding when and if a patient needs to stop oral intake prior to surgery varies.Follow the specific instructions you were given                  XXXXX___Nothing to eat or to drink after Midnight the night before.                   ____Carbo loading or instructions will be given to select patients-if you have been given those instructions -please do the following                           The evening before your surgery after dinner before midnight drink 40 ounces of gatorade.If you are diabetic use sugar free.  The morning of surgery drink 40 ounces of water.This needs to be finished 3 hours prior to your surgery start time.    2. Take the following pills with a small sip of water on the morning of surgery___use inhalers if able to get them refilled________________________________________________                  Do not take blood pressure medications ending in pril or sartan the dorothea prior to surgery or the morning of surgery. Dr Gibson's patient are not to take any medications the AM of surgery.         3. Aspirin, Ibuprofen, Advil, Naproxen, Vitamin E and other Anti-inflammatory products and supplements should be stopped for 5 -7days before surgery or as directed by your physician.   4. Check with your Doctor regarding stopping Plavix, Coumadin,Eliquis, Lovenox,Effient,Pradaxa,Xarelto, Fragmin or other blood thinners and follow their instructions.   5. Do not smoke, and do not drink any alcoholic beverages 24 hours prior to surgery.  This includes NA Beer.Refrain from the usage of any recreational drugs.   6. You may brush your teeth and gargle the morning of surgery.  DO NOT SWALLOW WATER   7. You MUST make arrangements for a responsible adult to stay on site while you are here and take you home after your

## 2024-01-08 ENCOUNTER — ANESTHESIA (OUTPATIENT)
Dept: OPERATING ROOM | Age: 64
End: 2024-01-08
Payer: MEDICARE

## 2024-01-08 ENCOUNTER — HOSPITAL ENCOUNTER (OUTPATIENT)
Age: 64
Setting detail: OUTPATIENT SURGERY
Discharge: HOME | End: 2024-01-08
Attending: STUDENT IN AN ORGANIZED HEALTH CARE EDUCATION/TRAINING PROGRAM | Admitting: STUDENT IN AN ORGANIZED HEALTH CARE EDUCATION/TRAINING PROGRAM
Payer: MEDICARE

## 2024-01-08 ENCOUNTER — ANESTHESIA EVENT (OUTPATIENT)
Dept: OPERATING ROOM | Age: 64
End: 2024-01-08
Payer: MEDICARE

## 2024-01-08 VITALS
SYSTOLIC BLOOD PRESSURE: 154 MMHG | OXYGEN SATURATION: 93 % | TEMPERATURE: 96.9 F | DIASTOLIC BLOOD PRESSURE: 95 MMHG | HEIGHT: 68 IN | RESPIRATION RATE: 13 BRPM | WEIGHT: 112 LBS | HEART RATE: 93 BPM | BODY MASS INDEX: 16.97 KG/M2

## 2024-01-08 DIAGNOSIS — R49.0 DYSPHONIA: ICD-10-CM

## 2024-01-08 DIAGNOSIS — J38.3 LESION OF VOCAL CORD: ICD-10-CM

## 2024-01-08 DIAGNOSIS — G89.18 POST-OP PAIN: Primary | ICD-10-CM

## 2024-01-08 PROCEDURE — 2580000003 HC RX 258: Performed by: STUDENT IN AN ORGANIZED HEALTH CARE EDUCATION/TRAINING PROGRAM

## 2024-01-08 PROCEDURE — 3700000001 HC ADD 15 MINUTES (ANESTHESIA): Performed by: STUDENT IN AN ORGANIZED HEALTH CARE EDUCATION/TRAINING PROGRAM

## 2024-01-08 PROCEDURE — 6360000002 HC RX W HCPCS: Performed by: STUDENT IN AN ORGANIZED HEALTH CARE EDUCATION/TRAINING PROGRAM

## 2024-01-08 PROCEDURE — 7100000001 HC PACU RECOVERY - ADDTL 15 MIN: Performed by: STUDENT IN AN ORGANIZED HEALTH CARE EDUCATION/TRAINING PROGRAM

## 2024-01-08 PROCEDURE — 88305 TISSUE EXAM BY PATHOLOGIST: CPT

## 2024-01-08 PROCEDURE — 6370000000 HC RX 637 (ALT 250 FOR IP): Performed by: STUDENT IN AN ORGANIZED HEALTH CARE EDUCATION/TRAINING PROGRAM

## 2024-01-08 PROCEDURE — 6370000000 HC RX 637 (ALT 250 FOR IP): Performed by: NURSE ANESTHETIST, CERTIFIED REGISTERED

## 2024-01-08 PROCEDURE — 7100000000 HC PACU RECOVERY - FIRST 15 MIN: Performed by: STUDENT IN AN ORGANIZED HEALTH CARE EDUCATION/TRAINING PROGRAM

## 2024-01-08 PROCEDURE — 2709999900 HC NON-CHARGEABLE SUPPLY: Performed by: STUDENT IN AN ORGANIZED HEALTH CARE EDUCATION/TRAINING PROGRAM

## 2024-01-08 PROCEDURE — 3600000014 HC SURGERY LEVEL 4 ADDTL 15MIN: Performed by: STUDENT IN AN ORGANIZED HEALTH CARE EDUCATION/TRAINING PROGRAM

## 2024-01-08 PROCEDURE — 7100000011 HC PHASE II RECOVERY - ADDTL 15 MIN: Performed by: STUDENT IN AN ORGANIZED HEALTH CARE EDUCATION/TRAINING PROGRAM

## 2024-01-08 PROCEDURE — 3700000000 HC ANESTHESIA ATTENDED CARE: Performed by: STUDENT IN AN ORGANIZED HEALTH CARE EDUCATION/TRAINING PROGRAM

## 2024-01-08 PROCEDURE — 3600000004 HC SURGERY LEVEL 4 BASE: Performed by: STUDENT IN AN ORGANIZED HEALTH CARE EDUCATION/TRAINING PROGRAM

## 2024-01-08 PROCEDURE — 6360000002 HC RX W HCPCS: Performed by: NURSE ANESTHETIST, CERTIFIED REGISTERED

## 2024-01-08 PROCEDURE — 7100000010 HC PHASE II RECOVERY - FIRST 15 MIN: Performed by: STUDENT IN AN ORGANIZED HEALTH CARE EDUCATION/TRAINING PROGRAM

## 2024-01-08 PROCEDURE — 31536 LARYNGOSCOPY W/BX & OP SCOPE: CPT | Performed by: STUDENT IN AN ORGANIZED HEALTH CARE EDUCATION/TRAINING PROGRAM

## 2024-01-08 PROCEDURE — 2500000003 HC RX 250 WO HCPCS: Performed by: NURSE ANESTHETIST, CERTIFIED REGISTERED

## 2024-01-08 RX ORDER — SODIUM CHLORIDE 0.9 % (FLUSH) 0.9 %
5-40 SYRINGE (ML) INJECTION EVERY 12 HOURS SCHEDULED
Status: DISCONTINUED | OUTPATIENT
Start: 2024-01-08 | End: 2024-01-08 | Stop reason: HOSPADM

## 2024-01-08 RX ORDER — OXYCODONE HYDROCHLORIDE AND ACETAMINOPHEN 5; 325 MG/1; MG/1
1 TABLET ORAL EVERY 6 HOURS PRN
Qty: 20 TABLET | Refills: 0 | Status: SHIPPED | OUTPATIENT
Start: 2024-01-08 | End: 2024-01-13

## 2024-01-08 RX ORDER — ROCURONIUM BROMIDE 10 MG/ML
INJECTION, SOLUTION INTRAVENOUS PRN
Status: DISCONTINUED | OUTPATIENT
Start: 2024-01-08 | End: 2024-01-08 | Stop reason: SDUPTHER

## 2024-01-08 RX ORDER — ACETAMINOPHEN 325 MG/1
650 TABLET ORAL ONCE
Status: COMPLETED | OUTPATIENT
Start: 2024-01-08 | End: 2024-01-08

## 2024-01-08 RX ORDER — SODIUM CHLORIDE 9 MG/ML
INJECTION, SOLUTION INTRAVENOUS PRN
Status: DISCONTINUED | OUTPATIENT
Start: 2024-01-08 | End: 2024-01-08 | Stop reason: HOSPADM

## 2024-01-08 RX ORDER — DEXAMETHASONE SODIUM PHOSPHATE 4 MG/ML
INJECTION, SOLUTION INTRA-ARTICULAR; INTRALESIONAL; INTRAMUSCULAR; INTRAVENOUS; SOFT TISSUE PRN
Status: DISCONTINUED | OUTPATIENT
Start: 2024-01-08 | End: 2024-01-08 | Stop reason: SDUPTHER

## 2024-01-08 RX ORDER — FENTANYL CITRATE 50 UG/ML
INJECTION, SOLUTION INTRAMUSCULAR; INTRAVENOUS PRN
Status: DISCONTINUED | OUTPATIENT
Start: 2024-01-08 | End: 2024-01-08 | Stop reason: SDUPTHER

## 2024-01-08 RX ORDER — ONDANSETRON 2 MG/ML
4 INJECTION INTRAMUSCULAR; INTRAVENOUS
Status: DISCONTINUED | OUTPATIENT
Start: 2024-01-08 | End: 2024-01-08 | Stop reason: HOSPADM

## 2024-01-08 RX ORDER — APREPITANT 40 MG/1
40 CAPSULE ORAL ONCE
Status: COMPLETED | OUTPATIENT
Start: 2024-01-08 | End: 2024-01-08

## 2024-01-08 RX ORDER — OXYCODONE HYDROCHLORIDE 5 MG/1
5 TABLET ORAL
Status: DISCONTINUED | OUTPATIENT
Start: 2024-01-08 | End: 2024-01-08 | Stop reason: HOSPADM

## 2024-01-08 RX ORDER — KETAMINE HCL IN NACL, ISO-OSM 100MG/10ML
SYRINGE (ML) INJECTION PRN
Status: DISCONTINUED | OUTPATIENT
Start: 2024-01-08 | End: 2024-01-08 | Stop reason: SDUPTHER

## 2024-01-08 RX ORDER — SUCCINYLCHOLINE/SOD CL,ISO/PF 200MG/10ML
SYRINGE (ML) INTRAVENOUS PRN
Status: DISCONTINUED | OUTPATIENT
Start: 2024-01-08 | End: 2024-01-08 | Stop reason: SDUPTHER

## 2024-01-08 RX ORDER — PROPOFOL 10 MG/ML
INJECTION, EMULSION INTRAVENOUS PRN
Status: DISCONTINUED | OUTPATIENT
Start: 2024-01-08 | End: 2024-01-08 | Stop reason: SDUPTHER

## 2024-01-08 RX ORDER — MIDAZOLAM HYDROCHLORIDE 1 MG/ML
INJECTION INTRAMUSCULAR; INTRAVENOUS PRN
Status: DISCONTINUED | OUTPATIENT
Start: 2024-01-08 | End: 2024-01-08 | Stop reason: SDUPTHER

## 2024-01-08 RX ORDER — SODIUM CHLORIDE 0.9 % (FLUSH) 0.9 %
5-40 SYRINGE (ML) INJECTION PRN
Status: DISCONTINUED | OUTPATIENT
Start: 2024-01-08 | End: 2024-01-08 | Stop reason: HOSPADM

## 2024-01-08 RX ORDER — ALBUTEROL SULFATE 90 UG/1
AEROSOL, METERED RESPIRATORY (INHALATION) PRN
Status: DISCONTINUED | OUTPATIENT
Start: 2024-01-08 | End: 2024-01-08 | Stop reason: SDUPTHER

## 2024-01-08 RX ORDER — LIDOCAINE HYDROCHLORIDE 20 MG/ML
INJECTION, SOLUTION INFILTRATION; PERINEURAL PRN
Status: DISCONTINUED | OUTPATIENT
Start: 2024-01-08 | End: 2024-01-08 | Stop reason: SDUPTHER

## 2024-01-08 RX ORDER — FENTANYL CITRATE 50 UG/ML
50 INJECTION, SOLUTION INTRAMUSCULAR; INTRAVENOUS EVERY 5 MIN PRN
Status: DISCONTINUED | OUTPATIENT
Start: 2024-01-08 | End: 2024-01-08 | Stop reason: HOSPADM

## 2024-01-08 RX ORDER — SODIUM CHLORIDE 9 MG/ML
INJECTION, SOLUTION INTRAVENOUS CONTINUOUS
Status: DISCONTINUED | OUTPATIENT
Start: 2024-01-08 | End: 2024-01-08 | Stop reason: HOSPADM

## 2024-01-08 RX ORDER — OXYMETAZOLINE HYDROCHLORIDE 0.05 G/100ML
SPRAY NASAL
Status: COMPLETED | OUTPATIENT
Start: 2024-01-08 | End: 2024-01-08

## 2024-01-08 RX ORDER — HYDROMORPHONE HYDROCHLORIDE 2 MG/ML
0.5 INJECTION, SOLUTION INTRAMUSCULAR; INTRAVENOUS; SUBCUTANEOUS EVERY 5 MIN PRN
Status: DISCONTINUED | OUTPATIENT
Start: 2024-01-08 | End: 2024-01-08 | Stop reason: HOSPADM

## 2024-01-08 RX ORDER — PHENYLEPHRINE HCL IN 0.9% NACL 1 MG/10 ML
SYRINGE (ML) INTRAVENOUS PRN
Status: DISCONTINUED | OUTPATIENT
Start: 2024-01-08 | End: 2024-01-08 | Stop reason: SDUPTHER

## 2024-01-08 RX ORDER — SODIUM CHLORIDE, SODIUM LACTATE, POTASSIUM CHLORIDE, CALCIUM CHLORIDE 600; 310; 30; 20 MG/100ML; MG/100ML; MG/100ML; MG/100ML
INJECTION, SOLUTION INTRAVENOUS CONTINUOUS
Status: DISCONTINUED | OUTPATIENT
Start: 2024-01-08 | End: 2024-01-08 | Stop reason: HOSPADM

## 2024-01-08 RX ORDER — ONDANSETRON 2 MG/ML
INJECTION INTRAMUSCULAR; INTRAVENOUS PRN
Status: DISCONTINUED | OUTPATIENT
Start: 2024-01-08 | End: 2024-01-08 | Stop reason: SDUPTHER

## 2024-01-08 RX ADMIN — SUGAMMADEX 200 MG: 100 INJECTION, SOLUTION INTRAVENOUS at 13:05

## 2024-01-08 RX ADMIN — MIDAZOLAM 2 MG: 1 INJECTION INTRAMUSCULAR; INTRAVENOUS at 12:37

## 2024-01-08 RX ADMIN — ALBUTEROL SULFATE 2 PUFF: 90 AEROSOL, METERED RESPIRATORY (INHALATION) at 13:05

## 2024-01-08 RX ADMIN — Medication 100 MG: at 12:44

## 2024-01-08 RX ADMIN — FENTANYL CITRATE 25 MCG: 50 INJECTION, SOLUTION INTRAMUSCULAR; INTRAVENOUS at 12:47

## 2024-01-08 RX ADMIN — PROPOFOL 50 MG: 10 INJECTION, EMULSION INTRAVENOUS at 12:53

## 2024-01-08 RX ADMIN — ROCURONIUM BROMIDE 10 MG: 10 INJECTION, SOLUTION INTRAVENOUS at 12:54

## 2024-01-08 RX ADMIN — ONDANSETRON 4 MG: 2 INJECTION INTRAMUSCULAR; INTRAVENOUS at 13:00

## 2024-01-08 RX ADMIN — LIDOCAINE HYDROCHLORIDE 100 MG: 20 INJECTION, SOLUTION INFILTRATION; PERINEURAL at 12:43

## 2024-01-08 RX ADMIN — Medication 150 MCG: at 12:58

## 2024-01-08 RX ADMIN — PROPOFOL 150 MG: 10 INJECTION, EMULSION INTRAVENOUS at 12:43

## 2024-01-08 RX ADMIN — SODIUM CHLORIDE: 9 INJECTION, SOLUTION INTRAVENOUS at 12:30

## 2024-01-08 RX ADMIN — DEXAMETHASONE SODIUM PHOSPHATE 10 MG: 4 INJECTION, SOLUTION INTRAMUSCULAR; INTRAVENOUS at 13:00

## 2024-01-08 RX ADMIN — Medication 20 MG: at 12:43

## 2024-01-08 RX ADMIN — APREPITANT 40 MG: 40 CAPSULE ORAL at 12:09

## 2024-01-08 RX ADMIN — ACETAMINOPHEN 650 MG: 325 TABLET ORAL at 12:09

## 2024-01-08 ASSESSMENT — PAIN - FUNCTIONAL ASSESSMENT
PAIN_FUNCTIONAL_ASSESSMENT: 0-10
PAIN_FUNCTIONAL_ASSESSMENT: WONG-BAKER FACES
PAIN_FUNCTIONAL_ASSESSMENT: NONE - DENIES PAIN

## 2024-01-08 ASSESSMENT — PAIN DESCRIPTION - DESCRIPTORS: DESCRIPTORS: SORE

## 2024-01-08 ASSESSMENT — ENCOUNTER SYMPTOMS: SHORTNESS OF BREATH: 1

## 2024-01-08 ASSESSMENT — LIFESTYLE VARIABLES: SMOKING_STATUS: 1

## 2024-01-08 NOTE — ANESTHESIA PRE PROCEDURE
Department of Anesthesiology  Preprocedure Note       Name:  Homero Levin   Age:  63 y.o.  :  1960                                          MRN:  [id number]         Date:  2024      Surgeon: Surgeon(s):  Royce Comer MD    Procedure: Procedure(s):  DIRECT LARYNGOSCOPY WITH BIOPSY    Medications prior to admission:   Prior to Admission medications    Medication Sig Start Date End Date Taking? Authorizing Provider   ipratropium 0.5 mg-albuterol 2.5 mg (DUONEB) 0.5-2.5 (3) MG/3ML SOLN nebulizer solution Inhale 3 mLs into the lungs every 4 hours as needed NOT TAKING-NO REFILLS 22   Óscar Childers MD   oxyCODONE HCl (OXY-IR) 10 MG immediate release tablet Take 1 tablet by mouth every 4 hours as needed. NOT TAKING-NO REFILLS 23   Óscar Childers MD   Nebulizer MISC by Does not apply route    Óscar Childers MD   SYMBICORT 160-4.5 MCG/ACT AERO 2 puffs 2 times daily Take your inhaler as directed the DOS and bring with you DOS. NOT TAKING-NO REFILLS 22   Óscar Childers MD   tiotropium (SPIRIVA RESPIMAT) 2.5 MCG/ACT AERS inhaler Inhale into the lungs daily NOT TAKING-NO REFILLS 3/8/22   Óscar Childers MD   albuterol sulfate  (90 Base) MCG/ACT inhaler Take your inhaler as directed the DOS and bring with you DOS. NOT TAKING-NO REFILLS 3/29/21   Óscar Childers MD       Current medications:    Current Facility-Administered Medications   Medication Dose Route Frequency Provider Last Rate Last Admin    0.9 % sodium chloride infusion   IntraVENous Continuous Royce Comer MD        acetaminophen (TYLENOL) tablet 650 mg  650 mg Oral Once Radha Murrieta MD        lactated ringers IV soln infusion   IntraVENous Continuous Radha Murrieta MD        sodium chloride flush 0.9 % injection 5-40 mL  5-40 mL IntraVENous 2 times per day Radha Murrieta MD        sodium chloride flush 0.9 % injection 5-40 mL  5-40 mL IntraVENous PRN Radha Murrieta MD

## 2024-01-08 NOTE — H&P
[facility]  DIVISION OF OTOLARYNGOLOGY- HEAD & NECK SURGERY        Homero Levin (:  1960) is a 62 y.o. male, here for evaluation of the following chief complaint(s):  Follow-up      ASSESSMENT/PLAN:  1. Laryngeal squamous cell carcinoma (HCC)  2. Abnormal positron emission tomography (PET) scan      This is a very pleasant 62 y.o. male here today for evaluation of the the above-noted complaints.      We will plan to take him today for direct laryngoscopy and biopsy    We discussed the risk benefits alternatives including but not limited to bleeding, infection, airway obstruction, worsening of his voice, injury to the teeth lips or gums, and other rare complications including airway fire.  The patient expressed understanding and wishes to proceed.      -Risks and benefits of direct laryngoscopy and biopsy under general anesthesia include injury to the oral, oropharyngeal or laryngeal structures (teeth, lips, tongue, mucous membranes), hemorrhage, dysphagia/odynophagia, voice changes, need for further biopsies outlined. Patient in agreement with plan for procedure          Medical Decision Making:  The following items were considered in medical decision making:  Independent review of images  Review / order clinical lab tests  Review / order radiology tests  Decision to obtain old records  Review and summation of old records as accessed through [facility] if applicable    SUBJECTIVE/OBJECTIVE:  HPI No interval changes to history and physical.  Denies any recent illnesses.  No changes to medications.    REVIEW OF SYSTEMS  The following systems were reviewed and revealed the following in addition to any already discussed in the HPI:    PHYSICAL EXAM    GENERAL: No acute distress, alert and oriented, hoarse and rough voice  EYES: EOMI, Anti-icteric  HENT:   Head: Normocephalic and atraumatic.   Face:  Symmetric, facial nerve intact  Right Ear: Normal external ear, normal external auditory canal, intact

## 2024-01-08 NOTE — PROGRESS NOTES
Pt dressed and ready for discharge.  VSS.  Instructions reviewed with patient and son Jonny.  All questions answered; verbalized understanding.  All belongings returned to pt (cell phone).  Pt left department via wheelchair to private vehicle.

## 2024-01-08 NOTE — PROGRESS NOTES
Pt arrived to PACU bay 10 from OR via stretcher.  VSS.  O2 on @ 6L per SM.  Bedside report received from OR RN/CRNA.

## 2024-01-08 NOTE — OP NOTE
Patient Name: Homero Levin  YOB: 1960  Medical Record Number:  [id number]  Billing Number:  [id number]  Date of Procedure: 1/8/2024  Time: 1300    Pre Operative Diagnoses: Dysphonia, left vocal cord lesion, history of laryngeal cancer.  Post Operative Diagnoses: Same.           Procedure:    1.  Microlaryngoscopy with biopsy           Surgeon: Royce Comer MD  Assistant: None.  Anesthesia:  General anesthesia.     Findings:    1) The exposure was good  2) The oral cavity and oropharynx appeared free of lesions except for postradiation changes, poor dentition, there was no lesions or masses in the pharyx and base of tongue  3) The supraglottis appeared with radiation changes.  There was evidence of an exophytic lesion with subglottic extension involving the lateral aspect of the left cord, paraglottic space and left arytenoid.  There is evidence of a large exophytic lesion coming off the inferior aspect of the left vocal cord  4) The pyriform sinuses and postcricoid area appeared free of  4) The glottis appeared as noted above with radiation changes and an exophytic mass along the inferior aspect of the left cord      Indications:  Homero is a now 63 y.o. male with a history of laryngeal cancer with poor follow-up.  He was noted in clinic to have irregular changes to the same location as his laryngeal cancer..    Description: After verification of informed consent, the patient was brought to the operating room and placed in the supine position. General anesthesia was induced. The  Dedo laryngoscope with dental guard was used to expose the larynx the supraglottis and glottis as described. A thorough evaluation of the oral cavity, vallecula, base of tongue, bilateral tonsils, true and false vocal cords and bilateral pyriform sinuses was performed.  At this point, the operating microscope was brought into the field.  It was utilized to expose the larynx.  There was obvious involvement of the inferior

## 2024-01-08 NOTE — ANESTHESIA POSTPROCEDURE EVALUATION
Department of Anesthesiology  Postprocedure Note    Patient: Homero Levin  MRN: [id number]  YOB: 1960  Date of evaluation: 1/8/2024    Procedure Summary       Date: 01/08/24 Room / Location: [address]    Anesthesia Start: 1237 Anesthesia Stop: 1329    Procedure: MICRO LARYNGOSCOPY WITH BIOPSY (Throat) Diagnosis:       Dysphonia      Lesion of vocal cord      (Dysphonia [R49.0])      (Lesion of vocal cord [J38.3])    Surgeons: Royce Comer MD Responsible Provider: Radha Murrieta MD    Anesthesia Type: General ASA Status: 3            Anesthesia Type: General    Elsy Phase I: Elsy Score: 6    Elsy Phase II:      Anesthesia Post Evaluation    Patient location during evaluation: bedside  Patient participation: complete - patient participated  Level of consciousness: awake and alert  Pain score: 1  Airway patency: patent  Nausea & Vomiting: no vomiting  Cardiovascular status: hemodynamically stable  Respiratory status: nonlabored ventilation  Hydration status: stable  Multimodal analgesia pain management approach  Pain management: adequate    No notable events documented.   stated

## 2024-01-08 NOTE — DISCHARGE INSTRUCTIONS
Post-op Laryngoscopy Instructions    GENERAL  Please observe voice rest for 1 week after surgery.  Hoarseness may persist for 2-3 week after surgery.  This is part of the normal healing process.  Speak in a normal voice.  DO NOT whisper this causes greater stress on your voice  Avoid excessive coughing or throat clearing  DO NOT SMOKE     DIET   It is very important to drink plenty of fluids.  Dehydration and not swallowing increase the pain and prolong the healing process.  Soft foods are preferable.  Avoid hot, spicy or acidic foods.  Carbonated beverages tend to be uncomfortable.    FEVER   Low grade fever up to 101 is normal within the first 48 hours.  Fever greater than 101 should be addressed by calling the number above    BLEEDING   Small amount of blood tinged secretions in your saliva is common during the first 24 hours.    ACTIVITY   1 week off school/work is required following surgery.  NO strenuous activity, sports, physical education or heavy lifting for 2 weeks.    MEDICATIONS     A narcotic medication will also be given please take this around the clock as prescribed during the first two days, then as needed for pain during the next 2 weeks.  Keep in mind the narcotic also has Tylenol (acetaminophen) in it so do not take Tylenol in conjunction with the narcotic pain medication.  DO NOT use any NSAIDS (Motrin, Advil, Aleve, and Ibuprofen) this may cause bleeding.    ADDITIONAL INFO   Sleep for the first few nights with your head elevated, this will help with swelling.  Cool vapor bedside humidification is advisable if available.  Ice packs to the neck as needed.  CALL:  With any questions or concerns regarding your post-operative course      ANESTHESIA DISCHARGE INSTRUCTIONS    Wear your seatbelt home.  You are under the influence of drugs-do not drink alcohol,drive,operate machinery,or make any important decisions or sign any legal documentsfor 24 hours  A responsible adult needs to be with you for 24

## 2024-01-10 ENCOUNTER — TELEPHONE (OUTPATIENT)
Dept: ENT CLINIC | Age: 64
End: 2024-01-10

## 2024-01-10 DIAGNOSIS — R49.0 DYSPHONIA: ICD-10-CM

## 2024-01-10 DIAGNOSIS — C32.9 LARYNGEAL SQUAMOUS CELL CARCINOMA (HCC): Primary | ICD-10-CM

## 2024-01-10 DIAGNOSIS — J38.3 VOCAL CORD MASS: ICD-10-CM

## 2024-01-10 NOTE — TELEPHONE ENCOUNTER
I called and discussed the patient's pathology results with him.  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  I discussed with him that he will likely require surgery to address this.  I have reached out to my colleagues at the [facility] we will work on getting him in for evaluation and subsequent treatment.  We discussed that if in the interim, he develops shortness of breath, bleeding from the mouth or any other concerning signs or symptoms he should give our office a call.

## 2024-01-15 ENCOUNTER — TELEPHONE (OUTPATIENT)
Dept: ENT CLINIC | Age: 64
End: 2024-01-15

## 2024-01-15 NOTE — TELEPHONE ENCOUNTER
Spoke with Patient Rosangela with Dr. Charles office will be reaching out. Gave Patient Rosangela's office number [phone]

## 2024-01-18 NOTE — PROGRESS NOTES
"             [facility] Medicine Services  Discharge Summary    Date of Service: 24  Patient Name: Bassam Cedeno  : 1939  MRN: [id number]    Date of Admission: 2024  Discharge Diagnosis: Syncope  Date of Discharge:  24  Primary Care Physician: Nitza Salas APRN      Presenting Problem:   Syncope and collapse [R55]  Abnormal EKG [R94.31]  CHF, acute on chronic [I50.9]  Closed head injury, initial encounter [S09.90XA]  Syncope, unspecified syncope type [R55]  Acute hypoxemic respiratory failure [J96.01]  Volume overload state of heart [E87.79]    Active and Resolved Hospital Problems:  Active Hospital Problems    Diagnosis POA    Chronic systolic heart failure [I50.22] Yes    Stage 3b chronic kidney disease [N18.32] Yes    Essential hypertension [I10] Yes    Peripheral arterial disease [I73.9] Yes    Chronic pain syndrome [G89.4] Yes    Presence of aortocoronary bypass graft [Z95.1] Not Applicable      Resolved Hospital Problems    Diagnosis POA    **Syncope, unspecified syncope type [R55] Yes    CHF, acute on chronic [I50.9] Yes         Hospital Course     HPI:  Per the H&P written by Jayne, dated 24:  \"Bassam Cedeno is a 84 y.o. male with a CMH of CKD3b, COPD, CAD, OA, HTN, HLD, PVD who presented to [facility] on 2024 with syncope.     Patient has been attempting to manage a CHF exacerbation as an outpatient, with increasing dosings of lasix via his cardiologist.  However, patient has severe hip pain, which seems to be from OA.  Patient states he has been trying out new pain medications, but nothing seems to be working well.  States at some point had a hip injection which helped his pain.  States prior to passing out, has severe pain in his right hip.  In the ED, patient imaging was negative for bony injury in the head and c spine after the fall.  Admitted to complete diuresis inpatient, as patient does not seem to be doing well at home.\"    Hospital " Very dysneic with any activity. Congested, non-productive cough. SaO2 93% on arrival to pre-op. After Duoneb treatment, SaO2 96%. Anesthesia informed. Course:  Patient seen per cardiology  Patient with heart failure with reduced ejection fraction and mitral regurg on overlying coronary artery disease  Patient with chronic kidney disease with creatinine stable at 2.5  Patient with moderate to severe mitral regurg  he was cleared for discharge per cardiology and nephrology  Patient will need SRIDEVI to evaluate his candidacy for mitral clip as an outpatient   patient with dysphagia seen per GI and is going to get outpatient EGD  Patient seen per endocrine for Jose F's disease  His blood pressure and heart rate are acceptable  Sodium and potassium were acceptable  Continue hydrocortisone 50 mg during hospital stay IV and fludrocortisone 50 mics every 12 hours without any change  Starting 3 days of 8mg of prednisone.  Should transition to prior dose after.   Patient can be discharged on his home medication        DISCHARGE Follow Up Recommendations for labs and diagnostics: f/u with PCP within 1 week, CT Chest to follow up abnormal PET scan in May per pulmonology      Reasons For Change In Medications and Indications for New Medications: Increase in prednisone for 3 days for jose f's      Day of Discharge     Vital Signs:  Temp:  [97.3 °F (36.3 °C)-97.6 °F (36.4 °C)] 97.6 °F (36.4 °C)  Heart Rate:  [79-88] 83  Resp:  [11-20] 15  BP: (131-144)/(79-90) 143/85    Physical Exam:  Physical Exam  Constitutional:       General: He is not in acute distress.  HENT:      Head: Normocephalic and atraumatic.      Right Ear: External ear normal.      Left Ear: External ear normal.   Cardiovascular:      Rate and Rhythm: Normal rate and regular rhythm.      Heart sounds: Normal heart sounds.   Pulmonary:      Effort: Pulmonary effort is normal. No respiratory distress.      Breath sounds: Normal breath sounds.   Abdominal:      General: Bowel sounds are normal.      Palpations: Abdomen is soft.      Tenderness: There is no abdominal tenderness.   Musculoskeletal:      Right lower  leg: No edema.      Left lower leg: No edema.   Skin:     General: Skin is warm and dry.   Neurological:      Mental Status: He is alert.      Comments: Moving all extremities   Psychiatric:         Mood and Affect: Mood normal.         Behavior: Behavior normal.            Pertinent  and/or Most Recent Results     LAB RESULTS:      Lab 01/18/24 0153 01/17/24  0156 01/16/24  0408 01/15/24  1334 01/15/24  0927 01/15/24  0926 01/14/24 2228 01/13/24 0229 01/12/24 0919   WBC 4.80 5.70 6.20  --   --  9.90 12.50*   < > 10.20   HEMOGLOBIN 12.8* 13.6 13.1  --   --  14.3 12.0*   < > 14.1   HEMATOCRIT 38.7 41.3 40.2  --   --  44.8 36.2*   < > 42.5   PLATELETS 145 170 157  --   --  184 161   < > 185   NEUTROS ABS 3.90 4.90 5.60  --   --  8.70* 11.30*   < > 8.00*   LYMPHS ABS 0.40* 0.40* 0.30*  --   --  0.50* 0.30*   < > 1.00   MONOS ABS 0.50 0.40 0.30  --   --  0.70 0.80   < > 1.10*   EOS ABS 0.00 0.00 0.00  --   --  0.00 0.00   < > 0.00   MCV 89.8 91.5 90.6  --   --  94.3 90.1   < > 92.6   PROCALCITONIN  --   --   --   --  0.11  --   --   --   --    LACTATE  --   --   --  1.3  --  2.1*  --   --   --    PROTIME  --   --   --   --   --   --   --   --  11.3   APTT  --   --   --   --   --   --   --   --  24.7*    < > = values in this interval not displayed.         Lab 01/18/24 0153 01/17/24  0156 01/16/24  0408 01/15/24  0927 01/14/24 2228 01/14/24  0005 01/13/24 0229   SODIUM 142 143 144 143 142 143 144   POTASSIUM 3.5 3.9 4.0 4.1 3.8 4.0 3.8   CHLORIDE 104 104 102 103 100 101 100   CO2 25.0 27.0 27.0 26.0 28.0 28.0 30.0*   ANION GAP 13.0 12.0 15.0 14.0 14.0 14.0 14.0   BUN 54* 60* 56* 55* 58* 49* 37*   CREATININE 2.19* 2.49* 2.50* 2.61* 2.81* 2.74* 2.24*   EGFR 28.8* 24.7* 24.6* 23.3* 21.3* 22.0* 28.0*   GLUCOSE 115* 131* 104* 79 107* 156* 157*   CALCIUM 9.0 9.1 9.2 9.8 9.3 9.3 8.9   IONIZED CALCIUM  --   --   --   --   --  1.16*  --    MAGNESIUM 2.0 2.2 2.0  --  2.0 2.0  --    PHOSPHORUS 3.6 4.0 5.1*  --  3.6 3.6  --     TSH  --   --   --   --   --   --  0.938         Lab 01/18/24  0153 01/17/24  0156 01/16/24  0408 01/15/24  0927 01/14/24  2228   TOTAL PROTEIN 5.4* 5.8* 5.9* 7.0 6.1   ALBUMIN 3.6 3.8 4.0 4.6 4.3   GLOBULIN 1.8 2.0 1.9 2.4 1.8   ALT (SGPT) 12 11 10 12 8   AST (SGOT) 16 18 19 24 16   BILIRUBIN 0.4 0.5 0.5 0.6 0.4   ALK PHOS 38* 41 44 51 43         Lab 01/14/24  1525 01/12/24  1123 01/12/24  0919   PROBNP 20,239.0*  --  16,283.0*   HSTROP T  --  69* 80*   PROTIME  --   --  11.3   INR  --   --  1.04                 Brief Urine Lab Results  (Last result in the past 365 days)        Color   Clarity   Blood   Leuk Est   Nitrite   Protein   CREAT   Urine HCG        01/12/24 1036 Dark Yellow   Clear   Negative   Trace   Negative   Trace                 Microbiology Results (last 10 days)       Procedure Component Value - Date/Time    Blood Culture - Blood, Arm, Right [[id number]]  (Normal) Collected: 01/15/24 1335    Lab Status: Preliminary result Specimen: Blood from Arm, Right Updated: 01/17/24 1415     Blood Culture No growth at 2 days    Blood Culture - Blood, Hand, Left [[id number]]  (Normal) Collected: 01/15/24 0926    Lab Status: Preliminary result Specimen: Blood from Hand, Left Updated: 01/18/24 0945     Blood Culture No growth at 3 days    COVID-19, FLU A/B, RSV PCR 1 HR TAT - Swab, Nasopharynx [[id number]]  (Normal) Collected: 01/12/24 1652    Lab Status: Final result Specimen: Swab from Nasopharynx Updated: 01/12/24 1743     COVID19 Not Detected     Influenza A PCR Not Detected     Influenza B PCR Not Detected     RSV, PCR Not Detected    Narrative:      Fact sheet for providers: https://www.fda.gov/media/014393/download    Fact sheet for patients: https://www.fda.gov/media/794673/download    Test performed by PCR.    Eosinophil Smear - Urine, Urine, Clean Catch [[id number]]  (Normal) Collected: 01/12/24 1036    Lab Status: Final result Specimen: Urine, Clean Catch Updated: 01/13/24 0909     Eosinophil Smear 0 %  EOS/100 Cells             XR Chest 1 View    Result Date: 1/17/2024  Impression: Impression: Stable diffuse reticular lung disease with mild airspace component may represent pulmonary edema or pneumonia Electronically Signed: Galo Hoover MD  1/17/2024 8:42 AM EST  Workstation ID: [id number]    XR Chest 1 View    Result Date: 1/16/2024  Impression: Impression: Worsening aeration of the right mid and lower lung, whether asymmetric interstitial edema or developing right lung pneumonia.. Electronically Signed: Farrukh Gambino MD  1/16/2024 8:28 AM EST  Workstation ID: [id number]    XR Chest PA & Lateral    Result Date: 1/14/2024  Impression: Impression: Right lower lobe consolidation with small bilateral effusions. Electronically Signed: Regulo Bobo MD  1/14/2024 12:13 PM EST  Workstation ID: [id number]    US Renal Bilateral    Result Date: 1/13/2024  Impression: 1.Negative ultrasound of the bilateral kidneys. There is no hydronephrosis bilaterally. Electronically Signed: Dashawn Whitaker MD  1/13/2024 3:40 PM EST  Workstation ID: [id number]    IR Inject/asp large joint or bursa    Result Date: 1/12/2024  Impression: Impression: Successful right hip steroid injection Electronically Signed: Dagoberto Lundy MD  1/12/2024 4:26 PM EST  Workstation ID: [id number]    XR Hips Bilateral With or Without Pelvis 3-4 View    Result Date: 1/12/2024  Impression: Impression: No acute osseous injury to the hips bilaterally. Electronically Signed: Luzmaria Oquendo MD  1/12/2024 12:33 PM CST  Workstation ID: [id number]    CT Head Without Contrast    Result Date: 1/12/2024  Impression: Impression: No evidence of acute intracranial abnormality. No evidence of acute fracture in the cervical spine. Right pleural effusion partially visualized. Electronically Signed: Roberto Franklin MD  1/12/2024 10:48 AM EST  Workstation ID: [id number]    CT Cervical Spine Without Contrast    Result Date: 1/12/2024  Impression: Impression: No evidence of acute intracranial  abnormality. No evidence of acute fracture in the cervical spine. Right pleural effusion partially visualized. Electronically Signed: Roberto Franklin MD  1/12/2024 10:48 AM EST  Workstation ID: [id number]    XR Chest 1 View    Result Date: 1/12/2024  Impression: Impression: Small/moderate right pleural effusion with right basilar airspace disease that could represent atelectasis and/or infection. Electronically Signed: Roberto Franklin MD  1/12/2024 9:47 AM EST  Workstation ID: [id number]     Results for orders placed during the hospital encounter of 05/20/23    Duplex Carotid Ultrasound CAR    Interpretation Summary    Right internal carotid artery demonstrates normal flow without evidence of hemodynamically significant stenosis.    Left internal carotid artery demonstrates normal flow without evidence of hemodynamically significant stenosis.      Results for orders placed during the hospital encounter of 05/20/23    Duplex Carotid Ultrasound CAR    Interpretation Summary    Right internal carotid artery demonstrates normal flow without evidence of hemodynamically significant stenosis.    Left internal carotid artery demonstrates normal flow without evidence of hemodynamically significant stenosis.      Results for orders placed during the hospital encounter of 01/12/24    Adult Transthoracic Echo Complete W/ Cont if Necessary Per Protocol    Interpretation Summary    Left ventricular systolic function is severely decreased. Left ventricular ejection fraction appears to be 36 - 40%.    The left ventricular cavity is mild to moderately dilated.    Left ventricular wall thickness is consistent with mild eccentric hypertrophy.    Left ventricular diastolic function was normal.    The right ventricular cavity is small in size.    The left atrial cavity is mild to moderately dilated.    Moderate to severe mitral valve regurgitation is present.    Estimated right ventricular systolic pressure from tricuspid regurgitation is  normal (<35 mmHg). Calculated right ventricular systolic pressure from tricuspid regurgitation is 34 mmHg.    Effective regurgitant orifice by Pisa method is 0.39 cm² with regurgitant volume of 69 cc and regurgitant fraction of 33%.    Akinetic distal anterior wall and apex noted    IVC was normal in size without any dilatation and collapsing with respiration adequately    Recommend SRIDEVI to assess mitral regurgitation and mitral valve if clinically indicated      Labs Pending at Discharge:  Pending Labs       Order Current Status    Blood Culture - Blood, Arm, Right Preliminary result    Blood Culture - Blood, Hand, Left Preliminary result            Procedures Performed           Consults:   Consults       Date and Time Order Name Status Description    1/16/2024 10:48 AM Inpatient Gastroenterology Consult Completed     1/15/2024 11:08 AM Inpatient Endocrinology Consult Completed     1/15/2024  9:43 AM Inpatient Pulmonology Consult Completed     1/12/2024  1:19 PM Inpatient Nephrology Consult Completed     1/12/2024 11:54 AM Inpatient Cardiology Consult Completed               Discharge Details        Discharge Medications        Changes to Medications        Instructions Start Date   predniSONE 1 MG tablet  Commonly known as: DELTASONE  What changed:   Another medication with the same name was added. Make sure you understand how and when to take each.  Another medication with the same name was changed. Make sure you understand how and when to take each.   4 mg, Oral, Daily      predniSONE 1 MG tablet  Commonly known as: DELTASONE  What changed: You were already taking a medication with the same name, and this prescription was added. Make sure you understand how and when to take each.   8 mg, Oral, Daily With Breakfast   Start Date: January 19, 2024     predniSONE 1 MG tablet  Commonly known as: DELTASONE  What changed: These instructions start on January 21, 2024. If you are unsure what to do until then, ask your  doctor or other care provider.   TAKE 4 TABLETS BY MOUTH EVERY MORNING   Start Date: January 21, 2024            Continue These Medications        Instructions Start Date   aspirin 81 MG tablet   81 mg, Oral, Daily      ferrous sulfate 324 (65 Fe) MG tablet delayed-release EC tablet   Take 1 tablet by mouth twice daily      fludrocortisone 0.1 MG tablet   0.05 mg, Oral, 2 Times Daily      fludrocortisone 0.1 MG tablet   0.05 mg, Oral, 2 Times Daily      furosemide 40 MG tablet  Commonly known as: LASIX   40 mg, Oral, Daily      hydrALAZINE 25 MG tablet  Commonly known as: APRESOLINE   25 mg, Oral, 2 Times Daily      metoprolol succinate XL 50 MG 24 hr tablet  Commonly known as: TOPROL-XL   50 mg, Oral, Daily      nitroglycerin 0.4 MG SL tablet  Commonly known as: NITROSTAT   0.4 mg, Sublingual, Every 5 Minutes PRN, Take no more than 3 doses in 15 minutes.      potassium chloride 10 MEQ CR tablet   Take 2 tablets by mouth once daily      ranolazine 500 MG 12 hr tablet  Commonly known as: RANEXA   250 mg, Oral, 2 Times Daily      rosuvastatin 10 MG tablet  Commonly known as: CRESTOR   Take 1 tablet by mouth once daily      tamsulosin 0.4 MG capsule 24 hr capsule  Commonly known as: FLOMAX   0.4 mg, Oral, Daily      traMADol 50 MG tablet  Commonly known as: ULTRAM   50 mg, Oral, Every 12 Hours PRN      Vitamin D3 50 MCG (2000 UT) capsule   2,000 Units, Oral, Daily               Allergies   Allergen Reactions    Hydrocodone Itching     Depends on dose         Discharge Disposition: Stable  [place] [facility] [place]:  Hospital:  Diet Order   Procedures    Diet: Cardiac Diets, Diabetic Diets; Healthy Heart (2-3 Na+); Consistent Carbohydrate; Texture: Regular Texture (IDDSI 7); Fluid Consistency: Thin (IDDSI 0)         Discharge Activity:   Activity Instructions       Activity as Tolerated                CODE STATUS:  Code Status and Medical Interventions:   Ordered at: 01/12/24 1134     Level Of Support Discussed With:     Patient     Code Status (Patient has no pulse and is not breathing):    CPR (Attempt to Resuscitate)     Medical Interventions (Patient has pulse or is breathing):    Full Support         Future Appointments   Date Time Provider Department Center   1/18/2024  2:15 PM Dilia Goodman MD [facility]   2/20/2024  2:00 PM [facility] E[facility] [place]   3/12/2024 10:45 AM SUZ[facility]NE CT 3 BH [facility]   3/19/2024  2:30 PM Sujey Gan DNP, APRN [facility]   6/10/2024  3:20 PM Lex Aleman MD [facility]       Additional Instructions for the Follow-ups that You Need to Schedule       Ambulatory Referral to Home Health (Hospital)   As directed      Face to Face Visit Date: 1/18/2024   Follow-up provider for Plan of Care?: I treated the patient in an acute care facility and will not continue treatment after discharge.   Follow-up provider: NEDA SALAS [[id number]]   Reason/Clinical Findings: Physical Deconditioning   Describe mobility limitations that make leaving home difficult: Physical Deconditioning   Nursing/Therapeutic Services Requested: Physical Therapy Occupational Therapy   PT orders: Therapeutic exercise Gait Training   Weight Bearing Status: As Tolerated   Occupational orders: Energy conservation Strengthening Fine motor Activities of daily living   Frequency: 1 Week 1        Call MD With Problems / Concerns   As directed      Instructions: Return to care if worsening chest pain, shortness of breath.    Order Comments: Instructions: Return to care if worsening chest pain, shortness of breath.         Discharge Follow-up with PCP   As directed       Currently Documented PCP:    Neda Salas APRN    PCP Phone Number:    [phone]     Follow Up Details: Within 1 week                Time spent on Discharge including face to face service:  >30 minutes    Signature: Electronically signed by Tamir Godoy MD, 01/18/24, 12:12 EST.  [facility] Hospitalist Team

## (undated) DEVICE — SPONGE,NEURO,0.5"X0.5",XR,STRL,10/PK: Brand: MEDLINE

## (undated) DEVICE — GUARD TOOTH SM

## (undated) DEVICE — TOWEL,OR,DSP,ST,BLUE,STD,4/PK,20PK/CS: Brand: MEDLINE

## (undated) DEVICE — SPONGE,TONSIL,DBL STRNG,XRAY,SM,7/8",ST: Brand: MEDLINE INDUSTRIES, INC.

## (undated) DEVICE — SYRINGE IRRIG 60ML SFT PLIABLE BLB EZ TO GRP 1 HND USE W/

## (undated) DEVICE — GOWN,REINFORCED,POLY,SIRUS,XLNG/XXLG: Brand: MEDLINE

## (undated) DEVICE — SHEET,DRAPE,53X77,STERILE: Brand: MEDLINE

## (undated) DEVICE — SUTURE VCRL + SZ 2-0 L27IN ABSRB WHT SH 1/2 CIR TAPERCUT VCP417H

## (undated) DEVICE — SYRINGE, LUER LOCK, 10ML: Brand: MEDLINE

## (undated) DEVICE — GAUZE,SPONGE,4"X4",16PLY,XRAY,STRL,LF: Brand: MEDLINE

## (undated) DEVICE — SPONGE TNSL 7/8IN MED TPE STRNG RADOPQ

## (undated) DEVICE — GLOVE SURG SZ 85 L12IN FNGR ORTHO 126MIL CRM LTX FREE

## (undated) DEVICE — SPONGE,NEURO,0.5"X3",XR,STRL,LF,10/PK: Brand: MEDLINE

## (undated) DEVICE — COAGULATOR SUCT 10FR L6IN HND FT SWCH VALLEYLAB

## (undated) DEVICE — SPONGE SURG 0.5X0.5 IN POLICOT POLYESTER LF

## (undated) DEVICE — MARKER,SKIN,WI/RULER AND LABELS: Brand: MEDLINE

## (undated) DEVICE — CLEANER ES TIP W2XL2IN ADH BK RADPQ FOR S STL ELECTRD

## (undated) DEVICE — TUBING, SUCTION, 1/4" X 12', STRAIGHT: Brand: MEDLINE

## (undated) DEVICE — DRAPE,EENT,SPLIT,STERILE: Brand: MEDLINE

## (undated) DEVICE — MERCY HEALTH WEST TURNOVER: Brand: MEDLINE INDUSTRIES, INC.

## (undated) DEVICE — MASC TURNOVER KIT: Brand: MEDLINE INDUSTRIES, INC.

## (undated) DEVICE — COAGULATOR SUCT 10FR LAIN FTSWCH ACTIVATION DISP VALLEYLAB

## (undated) DEVICE — ENT I-LF: Brand: MEDLINE INDUSTRIES, INC.

## (undated) DEVICE — PACK PROCEDURE SURG EXTREMITY MFFOP CUST

## (undated) DEVICE — TUBING, SUCTION, 1/4" X 10', STRAIGHT: Brand: MEDLINE

## (undated) DEVICE — KIT,ANTI FOG,W/SPONGE & FLUID,SOFT PACK: Brand: MEDLINE

## (undated) DEVICE — GLOVE ORANGE PI 8 1/2   MSG9085

## (undated) DEVICE — SOLUTION IV IRRIG 250ML ST LF 0.9% SODIUM 2F7122

## (undated) DEVICE — CATH URETH 12FR RUBBER SOLID TIP

## (undated) DEVICE — MEDICINE CUP, GRADUATED, STER: Brand: MEDLINE

## (undated) DEVICE — YANKAUER,BULB TIP,W/O VENT,RIGID,STERILE: Brand: MEDLINE

## (undated) DEVICE — GLOVE ORANGE PI 7 1/2   MSG9075

## (undated) DEVICE — BLADE ES ELASTOMERIC COAT INSUL DURABLE BEND UPTO 90DEG

## (undated) DEVICE — BOWL MED L 32OZ PLAS W/ MOLD GRAD EZ OPN PEEL PCH

## (undated) DEVICE — STERILE POLYISOPRENE POWDER-FREE SURGICAL GLOVES: Brand: PROTEXIS

## (undated) DEVICE — CATHETER,URETHRAL,REDRUBBER,STRL,12FR: Brand: MEDLINE INDUSTRIES, INC.

## (undated) DEVICE — SOLUTION IRRIG 500ML 0.9% SOD CHL USP POUR PLAS BTL

## (undated) DEVICE — GOWN,AURORA,NONREINF,RAGLAN,XXL,STERILE: Brand: MEDLINE